# Patient Record
Sex: FEMALE | Employment: OTHER | URBAN - METROPOLITAN AREA
[De-identification: names, ages, dates, MRNs, and addresses within clinical notes are randomized per-mention and may not be internally consistent; named-entity substitution may affect disease eponyms.]

---

## 2017-01-27 ENCOUNTER — TELEPHONE (OUTPATIENT)
Dept: INTERNAL MEDICINE CLINIC | Facility: CLINIC | Age: 62
End: 2017-01-27

## 2017-01-27 DIAGNOSIS — R05.9 COUGH: Primary | ICD-10-CM

## 2017-01-27 NOTE — TELEPHONE ENCOUNTER
Pt asking to speak with Dr. Hesham Fxo regarding a personal issue regarding her mother (mom is not a pt of Dr. Hesham Fox). Please advise.

## 2017-02-16 ENCOUNTER — TELEPHONE (OUTPATIENT)
Dept: INTERNAL MEDICINE CLINIC | Facility: CLINIC | Age: 62
End: 2017-02-16

## 2017-02-16 NOTE — TELEPHONE ENCOUNTER
Pt contacted and informed of NK note below.  appt made as requested  Future Appointments  Date Time Provider Anne Zavala   2/17/2017 11:40 AM Lisandra Medina MD [address]

## 2017-02-16 NOTE — TELEPHONE ENCOUNTER
Per pt, she always have an inverted nipple eversince and last night pt notice that she has bruise on her left breast and would like to know if NK can see her today, tomorrow of Monday,  No appt for today and tomorrow or Monday is only SDS appt is available

## 2017-02-16 NOTE — TELEPHONE ENCOUNTER
Reason for Call/Chief Complaint: left bruise breast  Onset: pt states just noticed it yesterday while undressing.  Unsure of how long she has had bruise  Nursing Assessment/Associated Symptoms: pt c/o yellow green bruise around half of her areola of left br

## 2017-02-17 ENCOUNTER — OFFICE VISIT (OUTPATIENT)
Dept: INTERNAL MEDICINE CLINIC | Facility: CLINIC | Age: 62
End: 2017-02-17

## 2017-02-17 VITALS
HEART RATE: 97 BPM | DIASTOLIC BLOOD PRESSURE: 81 MMHG | WEIGHT: 209 LBS | RESPIRATION RATE: 20 BRPM | BODY MASS INDEX: 37 KG/M2 | SYSTOLIC BLOOD PRESSURE: 136 MMHG

## 2017-02-17 DIAGNOSIS — R58 ECCHYMOSIS: Primary | ICD-10-CM

## 2017-02-17 DIAGNOSIS — Z12.31 ENCOUNTER FOR SCREENING MAMMOGRAM FOR MALIGNANT NEOPLASM OF BREAST: ICD-10-CM

## 2017-02-17 PROCEDURE — 99213 OFFICE O/P EST LOW 20 MIN: CPT | Performed by: INTERNAL MEDICINE

## 2017-02-17 PROCEDURE — 99212 OFFICE O/P EST SF 10 MIN: CPT | Performed by: INTERNAL MEDICINE

## 2017-02-17 NOTE — PROGRESS NOTES
HPI:    Patient ID: Linus Ledbetter is a 62year old female. HPI  Couple days ago discovered sore area on the left breast, discovered bruise, she has no explanation how it happened, no obvious history of trauma.   Hearing is tender, left nipple slightly i breast exhibits no mass, no skin change and no tenderness. Left breast exhibits tenderness. Left breast exhibits no mass and no nipple discharge.  Breasts are symmetrical.       Bruise over left lower inner quadrant of the breast, no masses palpated   Lymph

## 2017-02-18 ENCOUNTER — HOSPITAL ENCOUNTER (OUTPATIENT)
Dept: MAMMOGRAPHY | Age: 62
Discharge: HOME | End: 2017-02-18
Attending: INTERNAL MEDICINE
Payer: COMMERCIAL

## 2017-02-18 DIAGNOSIS — Z12.31 ENCOUNTER FOR SCREENING MAMMOGRAM FOR MALIGNANT NEOPLASM OF BREAST: ICD-10-CM

## 2017-02-18 PROCEDURE — 77067 SCR MAMMO BI INCL CAD: CPT

## 2017-02-22 ENCOUNTER — TELEPHONE (OUTPATIENT)
Dept: INTERNAL MEDICINE CLINIC | Facility: CLINIC | Age: 62
End: 2017-02-22

## 2017-02-22 NOTE — TELEPHONE ENCOUNTER
Pt would like to know her mammogram results? Pt was told that they would be available through [employer] but she cannot access them.

## 2017-02-24 NOTE — TELEPHONE ENCOUNTER
Notes Recorded by Dimitri Delacruz MD on 2/22/2017 at 7:09 PM  Call patient or send letter normal mammogram    LMTCB Please transfer to ext  [phone]

## 2017-02-24 NOTE — TELEPHONE ENCOUNTER
Notes Recorded by Teresa Obrien on 2/23/2017 at 11:29 AM  Letter mailed to pt. If the patient calls back please assist in setting up mychart. Thanks.

## 2017-07-10 ENCOUNTER — OFFICE VISIT (OUTPATIENT)
Dept: INTERNAL MEDICINE CLINIC | Facility: CLINIC | Age: 62
End: 2017-07-10

## 2017-07-10 VITALS
WEIGHT: 205.81 LBS | DIASTOLIC BLOOD PRESSURE: 74 MMHG | RESPIRATION RATE: 18 BRPM | HEIGHT: 63 IN | HEART RATE: 89 BPM | BODY MASS INDEX: 36.46 KG/M2 | TEMPERATURE: 99 F | SYSTOLIC BLOOD PRESSURE: 133 MMHG

## 2017-07-10 DIAGNOSIS — M54.17 RADICULOPATHY OF LUMBOSACRAL REGION: ICD-10-CM

## 2017-07-10 DIAGNOSIS — J98.01 BRONCHOSPASM, ACUTE: Primary | ICD-10-CM

## 2017-07-10 PROCEDURE — 99213 OFFICE O/P EST LOW 20 MIN: CPT | Performed by: INTERNAL MEDICINE

## 2017-07-10 PROCEDURE — 99212 OFFICE O/P EST SF 10 MIN: CPT | Performed by: INTERNAL MEDICINE

## 2017-07-10 RX ORDER — ALBUTEROL SULFATE 90 UG/1
2 AEROSOL, METERED RESPIRATORY (INHALATION) EVERY 4 HOURS PRN
Qty: 1 INHALER | Refills: 6 | Status: SHIPPED | OUTPATIENT
Start: 2017-07-10 | End: 2019-01-24

## 2017-07-10 RX ORDER — AZITHROMYCIN 250 MG/1
TABLET, FILM COATED ORAL
Qty: 6 TABLET | Refills: 0 | Status: SHIPPED | OUTPATIENT
Start: 2017-07-10 | End: 2017-07-18

## 2017-07-10 RX ORDER — CODEINE PHOSPHATE AND GUAIFENESIN 10; 100 MG/5ML; MG/5ML
5 SOLUTION ORAL EVERY 6 HOURS PRN
Qty: 120 ML | Refills: 0 | Status: SHIPPED | OUTPATIENT
Start: 2017-07-10 | End: 2017-07-18

## 2017-07-10 RX ORDER — METHYLPREDNISOLONE 4 MG/1
TABLET ORAL
Qty: 1 KIT | Refills: 0 | Status: SHIPPED | OUTPATIENT
Start: 2017-07-10 | End: 2017-07-18

## 2017-07-10 NOTE — PROGRESS NOTES
HPI:    Patient ID: Reyes Prince is a 65year old female. She has been coughing and SOB for a week. She tried using albuterol from a previous URI. She was fertilizing her lawn on Saturday and it seemed to aggravate it.       Cough   This is a new pro Eyes: Conjunctivae are normal.   Cardiovascular: Normal rate, regular rhythm and normal heart sounds. Pulmonary/Chest: Effort normal. No respiratory distress. She has wheezes. She has no rales.    Diffuse bilateral wheezes   Lymphadenopathy:     She ha

## 2017-07-18 ENCOUNTER — OFFICE VISIT (OUTPATIENT)
Dept: INTERNAL MEDICINE CLINIC | Facility: CLINIC | Age: 62
End: 2017-07-18

## 2017-07-18 ENCOUNTER — HOSPITAL ENCOUNTER (OUTPATIENT)
Dept: GENERAL RADIOLOGY | Age: 62
Discharge: HOME | End: 2017-07-18
Attending: INTERNAL MEDICINE
Payer: COMMERCIAL

## 2017-07-18 VITALS
HEART RATE: 66 BPM | OXYGEN SATURATION: 92 % | RESPIRATION RATE: 18 BRPM | BODY MASS INDEX: 36 KG/M2 | SYSTOLIC BLOOD PRESSURE: 125 MMHG | DIASTOLIC BLOOD PRESSURE: 77 MMHG | TEMPERATURE: 98 F | WEIGHT: 206 LBS

## 2017-07-18 DIAGNOSIS — M54.17 RADICULOPATHY, LUMBOSACRAL REGION: Primary | ICD-10-CM

## 2017-07-18 DIAGNOSIS — R05.9 COUGH: ICD-10-CM

## 2017-07-18 DIAGNOSIS — J98.01 BRONCHOSPASM, ACUTE: ICD-10-CM

## 2017-07-18 PROCEDURE — 99214 OFFICE O/P EST MOD 30 MIN: CPT | Performed by: INTERNAL MEDICINE

## 2017-07-18 PROCEDURE — 94640 AIRWAY INHALATION TREATMENT: CPT | Performed by: INTERNAL MEDICINE

## 2017-07-18 PROCEDURE — 99212 OFFICE O/P EST SF 10 MIN: CPT | Performed by: INTERNAL MEDICINE

## 2017-07-18 PROCEDURE — 71020 XR CHEST PA + LAT CHEST (CPT=71020): CPT | Performed by: INTERNAL MEDICINE

## 2017-07-18 RX ORDER — PREDNISONE 10 MG/1
TABLET ORAL
Qty: 24 TABLET | Refills: 0 | Status: SHIPPED
Start: 2017-07-18 | End: 2017-07-26

## 2017-07-18 RX ORDER — CODEINE PHOSPHATE AND GUAIFENESIN 10; 100 MG/5ML; MG/5ML
5 SOLUTION ORAL 4 TIMES DAILY PRN
Qty: 180 ML | Refills: 0 | Status: SHIPPED | OUTPATIENT
Start: 2017-07-18 | End: 2017-07-28

## 2017-07-18 RX ORDER — PREDNISONE 10 MG/1
TABLET ORAL
Qty: 24 TABLET | Refills: 0 | Status: SHIPPED | OUTPATIENT
Start: 2017-07-18 | End: 2017-07-26

## 2017-07-18 RX ORDER — LEVALBUTEROL INHALATION SOLUTION 0.63 MG/3ML
0.63 SOLUTION RESPIRATORY (INHALATION) ONCE
Status: COMPLETED | OUTPATIENT
Start: 2017-07-18 | End: 2017-07-18

## 2017-07-18 RX ADMIN — LEVALBUTEROL INHALATION SOLUTION 0.63 MG: 0.63 SOLUTION RESPIRATORY (INHALATION) at 13:27:00

## 2017-07-18 NOTE — PROGRESS NOTES
HPI:    Patient ID: Eriberto Wu is a 65year old female presents for follow-up on cough. HPI  Presents for follow-up on cough. . Patient reports that cough developed 3 weeks ago initially she was wheezing.   She was seen by Dr. Nury Ortega 1 week ago compl breakfast. Take vit D replacement as per pcp Disp: 30 tablet Rfl: 12   Cyclobenzaprine HCl 10 MG Oral Tab Take 1 tablet (10 mg total) by mouth nightly.  Disp: 30 tablet Rfl: 0   predniSONE 10 MG Oral Tab Take 20 mg (2 tabs) by mouth 2 times daily for 3 days normal.              ASSESSMENT/PLAN:   Bronchospasm, acute will continue treatment with prednisone in tapering doses, use Proventil inhaler every 4-6 hours for the next 2 3 days, report in 1 week if she is not improved  Radiculopathy, lumbosacral region

## 2017-10-16 ENCOUNTER — TELEPHONE (OUTPATIENT)
Dept: INTERNAL MEDICINE CLINIC | Facility: CLINIC | Age: 62
End: 2017-10-16

## 2017-10-16 RX ORDER — DOXEPIN HYDROCHLORIDE 10 MG/1
10 CAPSULE ORAL NIGHTLY PRN
Qty: 30 CAPSULE | Refills: 0 | Status: SHIPPED | OUTPATIENT
Start: 2017-10-16 | End: 2017-11-12

## 2017-10-16 RX ORDER — LORAZEPAM 0.5 MG/1
TABLET ORAL
Qty: 5 TABLET | Refills: 0 | Status: SHIPPED | OUTPATIENT
Start: 2017-10-16 | End: 2017-10-16 | Stop reason: CLARIF

## 2017-10-16 NOTE — TELEPHONE ENCOUNTER
Dr Kuldeep Goodwin, patient is available on her cell today [phone] and is aware you are in the office until 5 today

## 2017-10-17 RX ORDER — PANTOPRAZOLE SODIUM 40 MG/1
40 TABLET, DELAYED RELEASE ORAL
Qty: 90 TABLET | Refills: 4 | Status: SHIPPED | OUTPATIENT
Start: 2017-10-17 | End: 2018-03-22

## 2017-10-17 NOTE — TELEPHONE ENCOUNTER
Dr. Alie Henriquez, Rx request for Pantoprazole Sodium 40 mg, Please review. Thank you.     LOV: 8/14/15 for EGD  Last Refill: 9/8/16 #30 with 12 Refills

## 2017-10-17 NOTE — TELEPHONE ENCOUNTER
Please have patient make a return appointment within 2-3 months to discuss long-term pantoprazole use. Rx sent for 1 year.

## 2017-10-17 NOTE — TELEPHONE ENCOUNTER
Letter generated and mailed to pt home address. CSS/MARIPOSA- please assist in scheduling OV appt, thanks.

## 2017-10-17 NOTE — TELEPHONE ENCOUNTER
LMTCB, please help pt schedule appt within 2-3 months as requested by Dr. Tom Godinez below. Thank you.

## 2017-10-18 RX ORDER — PANTOPRAZOLE SODIUM 40 MG/1
TABLET, DELAYED RELEASE ORAL
Qty: 30 TABLET | Refills: 0 | OUTPATIENT
Start: 2017-10-18

## 2017-10-25 NOTE — TELEPHONE ENCOUNTER
Spoke to patient on 10/14/2017, reported abnormal test results, she already made an appointment to see orthopedic specialist, she has a disc with your test results, I made one report to her home address

## 2017-10-27 ENCOUNTER — TELEPHONE (OUTPATIENT)
Dept: INTERNAL MEDICINE CLINIC | Facility: CLINIC | Age: 62
End: 2017-10-27

## 2017-11-02 NOTE — TELEPHONE ENCOUNTER
October 25, 2017   Kaleigh Rivera MD          6:45 PM   Note      Spoke to patient on 10/14/2017, reported abnormal test results, she already made an appointment to see orthopedic specialist, she has a disc with your test results, I made one report to her

## 2017-11-12 RX ORDER — DOXEPIN HYDROCHLORIDE 10 MG/1
CAPSULE ORAL
Qty: 30 CAPSULE | Refills: 0 | Status: SHIPPED | OUTPATIENT
Start: 2017-11-12 | End: 2019-01-05 | Stop reason: ALTCHOICE

## 2017-11-17 ENCOUNTER — TELEPHONE (OUTPATIENT)
Dept: INTERNAL MEDICINE CLINIC | Facility: CLINIC | Age: 62
End: 2017-11-17

## 2017-11-17 RX ORDER — TRAMADOL HYDROCHLORIDE 50 MG/1
50 TABLET ORAL EVERY 6 HOURS PRN
Qty: 120 TABLET | Refills: 0 | OUTPATIENT
Start: 2017-11-17 | End: 2018-10-08

## 2017-11-17 NOTE — TELEPHONE ENCOUNTER
Pt states would like to know if Dr. Marissa Douglas can prescribe some pain meds that will help the Pt through out the day for her back until surgery in 1/2018, the reason she needs meds is due to having this surgery Fuse L2-L5 and stenosis.      Pt states has already

## 2017-11-17 NOTE — TELEPHONE ENCOUNTER
Spoke to patient, she will try tramadol 50 mg every 6 hours, warned about possible sedation, advised that that is nonnarcotic pain medication and could be helpful.   She can combine it with doxepin at bedtime, but advised her to start this medication first

## 2018-01-02 ENCOUNTER — HOSPITAL ENCOUNTER (OUTPATIENT)
Age: 63
Discharge: HOME | End: 2018-01-02
Attending: FAMILY MEDICINE
Payer: COMMERCIAL

## 2018-01-02 ENCOUNTER — NURSE TRIAGE (OUTPATIENT)
Dept: OTHER | Age: 63
End: 2018-01-02

## 2018-01-02 VITALS
RESPIRATION RATE: 20 BRPM | OXYGEN SATURATION: 96 % | DIASTOLIC BLOOD PRESSURE: 68 MMHG | HEART RATE: 76 BPM | TEMPERATURE: 98 F | SYSTOLIC BLOOD PRESSURE: 149 MMHG

## 2018-01-02 DIAGNOSIS — J20.9 ACUTE BRONCHITIS, UNSPECIFIED ORGANISM: Primary | ICD-10-CM

## 2018-01-02 PROCEDURE — 99214 OFFICE O/P EST MOD 30 MIN: CPT

## 2018-01-02 PROCEDURE — 99213 OFFICE O/P EST LOW 20 MIN: CPT

## 2018-01-02 RX ORDER — AZITHROMYCIN 250 MG/1
TABLET, FILM COATED ORAL
Qty: 1 PACKAGE | Refills: 0 | Status: SHIPPED | OUTPATIENT
Start: 2018-01-02 | End: 2018-01-07

## 2018-01-02 NOTE — ED PROVIDER NOTES
Patient presents with:  Cough/URI      HPI:     Ameya Spaulding is a 65year old female who presents with for chief complaint of chest congestion, cough. For 4 days. Patient today noticed that the phlegm is slightly green and yellow.   Denies fevers, chill atraumatic  Eyes: conjunctivae/corneas clear. PERRL, EOM's intact. Fundi benign. Ears: normal TM's and external ear canals both ears  Nose: no discharge, no sinus tenderness.  No nasal flaring  Throat: Normal oropharynx  Neck: no adenopathy  RESPIRATORY:

## 2018-01-02 NOTE — TELEPHONE ENCOUNTER
Action Requested: Summary for Provider     []  Critical Lab, Recommendations Needed  [] Need Additional Advice  []   FYI    []   Need Orders  [] Need Medications Sent to Pharmacy  []  Other     SUMMARY: Received call from patient who reports she is having

## 2018-03-22 ENCOUNTER — OFFICE VISIT (OUTPATIENT)
Dept: INTERNAL MEDICINE CLINIC | Facility: CLINIC | Age: 63
End: 2018-03-22

## 2018-03-22 VITALS
WEIGHT: 201 LBS | HEIGHT: 63 IN | DIASTOLIC BLOOD PRESSURE: 80 MMHG | TEMPERATURE: 98 F | SYSTOLIC BLOOD PRESSURE: 139 MMHG | HEART RATE: 82 BPM | BODY MASS INDEX: 35.61 KG/M2

## 2018-03-22 DIAGNOSIS — J02.9 SORE THROAT: Primary | ICD-10-CM

## 2018-03-22 DIAGNOSIS — J02.0 STREP THROAT: ICD-10-CM

## 2018-03-22 LAB
CONTROL LINE PRESENT WITH A CLEAR BACKGROUND (YES/NO): YES YES/NO
KIT LOT #: NORMAL NUMERIC
STREP GRP A CUL-SCR: POSITIVE

## 2018-03-22 PROCEDURE — 99213 OFFICE O/P EST LOW 20 MIN: CPT | Performed by: INTERNAL MEDICINE

## 2018-03-22 PROCEDURE — 99212 OFFICE O/P EST SF 10 MIN: CPT | Performed by: INTERNAL MEDICINE

## 2018-03-22 PROCEDURE — 87880 STREP A ASSAY W/OPTIC: CPT | Performed by: INTERNAL MEDICINE

## 2018-03-22 RX ORDER — PANTOPRAZOLE SODIUM 40 MG/1
40 TABLET, DELAYED RELEASE ORAL
COMMUNITY
Start: 2017-10-17 | End: 2018-03-22

## 2018-03-22 RX ORDER — PANTOPRAZOLE SODIUM 40 MG/1
40 TABLET, DELAYED RELEASE ORAL
Qty: 90 TABLET | Refills: 0 | Status: SHIPPED | OUTPATIENT
Start: 2018-03-22 | End: 2018-09-06

## 2018-03-22 RX ORDER — AMOXICILLIN AND CLAVULANATE POTASSIUM 875; 125 MG/1; MG/1
1 TABLET, FILM COATED ORAL 2 TIMES DAILY
Qty: 20 TABLET | Refills: 0 | Status: SHIPPED | OUTPATIENT
Start: 2018-03-22 | End: 2018-04-01

## 2018-03-22 NOTE — PROGRESS NOTES
Patient ID: Francis Moreno is a 65year old female.   Patient presents with:  Sore Throat: x4 days       HISTORY OF PRESENT ILLNESS:   HPI  3 day(s) ago  Fever - No, Tmax (N/A)  Cough - Yes, productive - Yes, color - clear  Sick contacts - Yes, 2 sons diag Aero Soln, Inhale 2 puffs into the lungs every 4 (four) hours as needed (cough). , Disp: 1 Inhaler, Rfl: 6  •  Cyclobenzaprine HCl 10 MG Oral Tab, Take 1 tablet (10 mg total) by mouth nightly., Disp: 30 tablet, Rfl: 0    Allergies:No Known Allergies      So submandibular, no preauricular, no posterior auricular and no occipital adenopathy present. Neurological: She is alert and oriented to person, place, and time. Psychiatric: She has a normal mood and affect.      Strep test - positive         ASSESSMENT/

## 2018-03-22 NOTE — PATIENT INSTRUCTIONS
Pharyngitis: Strep (Confirmed)    You have had a positive test for strep throat. Strep throat is a contagious illness. It is spread by coughing, kissing or by touching others after touching your mouth or nose.  Symptoms include throat pain that is worse w · Lymph nodes getting larger or becoming soft in the middle  · You can't swallow liquids or you can't open your mouth wide because of throat pain  · Signs of dehydration. These include very dark urine or no urine, sunken eyes, and dizziness.   · Trouble joseline

## 2018-09-06 ENCOUNTER — NURSE TRIAGE (OUTPATIENT)
Dept: OTHER | Age: 63
End: 2018-09-06

## 2018-09-06 ENCOUNTER — TELEPHONE (OUTPATIENT)
Dept: INTERNAL MEDICINE CLINIC | Facility: CLINIC | Age: 63
End: 2018-09-06

## 2018-09-06 RX ORDER — NITROFURANTOIN 25; 75 MG/1; MG/1
100 CAPSULE ORAL 2 TIMES DAILY
Qty: 14 CAPSULE | Refills: 0 | Status: SHIPPED | OUTPATIENT
Start: 2018-09-06 | End: 2018-10-08

## 2018-09-06 RX ORDER — PANTOPRAZOLE SODIUM 40 MG/1
40 TABLET, DELAYED RELEASE ORAL
Qty: 90 TABLET | Refills: 0 | Status: SHIPPED | OUTPATIENT
Start: 2018-09-06 | End: 2018-12-03

## 2018-09-06 NOTE — TELEPHONE ENCOUNTER
Action Requested: Summary for Provider     []  Critical Lab, Recommendations Needed  [x] Need Additional Advice  []   FYI    []   Need Orders  [x] Need Medications Sent to Pharmacy  []  Other     SUMMARY:  Pt requesting RX to Pharmacy- offered Appt -stated

## 2018-09-06 NOTE — TELEPHONE ENCOUNTER
Spoke to patient, we will treat him with Macrobid for 7 days, she knows to report back if she is not improving will be seen for evaluation, she is asking for refill on pantoprazole, she does not take medication daily because she does not have daily symptom

## 2018-09-06 NOTE — TELEPHONE ENCOUNTER
Received call from patient who reports she is returning Dr. Oswaldo Goldstein call. Provider made aware and states she will call patient. Patient made aware.

## 2018-09-13 NOTE — TELEPHONE ENCOUNTER
Pt called back, and informed that she finished antibiotic today and that she is feeling much improved.

## 2018-10-08 ENCOUNTER — LAB ENCOUNTER (OUTPATIENT)
Dept: LAB | Age: 63
End: 2018-10-08
Attending: INTERNAL MEDICINE
Payer: COMMERCIAL

## 2018-10-08 ENCOUNTER — OFFICE VISIT (OUTPATIENT)
Dept: INTERNAL MEDICINE CLINIC | Facility: CLINIC | Age: 63
End: 2018-10-08
Payer: COMMERCIAL

## 2018-10-08 VITALS
HEART RATE: 73 BPM | BODY MASS INDEX: 37.54 KG/M2 | SYSTOLIC BLOOD PRESSURE: 161 MMHG | WEIGHT: 204 LBS | TEMPERATURE: 98 F | RESPIRATION RATE: 18 BRPM | DIASTOLIC BLOOD PRESSURE: 79 MMHG | HEIGHT: 62 IN

## 2018-10-08 DIAGNOSIS — M65.30 TRIGGER FINGER OF RIGHT HAND, UNSPECIFIED FINGER: ICD-10-CM

## 2018-10-08 DIAGNOSIS — Z01.419 ENCOUNTER FOR ANNUAL ROUTINE GYNECOLOGICAL EXAMINATION: ICD-10-CM

## 2018-10-08 DIAGNOSIS — Z01.419 PAP SMEAR, AS PART OF ROUTINE GYNECOLOGICAL EXAMINATION: ICD-10-CM

## 2018-10-08 DIAGNOSIS — Z00.00 PHYSICAL EXAM, ANNUAL: ICD-10-CM

## 2018-10-08 DIAGNOSIS — Z00.00 PHYSICAL EXAM, ANNUAL: Primary | ICD-10-CM

## 2018-10-08 PROCEDURE — 80053 COMPREHEN METABOLIC PANEL: CPT

## 2018-10-08 PROCEDURE — 99396 PREV VISIT EST AGE 40-64: CPT | Performed by: INTERNAL MEDICINE

## 2018-10-08 PROCEDURE — 36415 COLL VENOUS BLD VENIPUNCTURE: CPT

## 2018-10-08 PROCEDURE — 80061 LIPID PANEL: CPT

## 2018-10-08 PROCEDURE — 85025 COMPLETE CBC W/AUTO DIFF WBC: CPT

## 2018-10-08 PROCEDURE — 84443 ASSAY THYROID STIM HORMONE: CPT

## 2018-10-09 NOTE — PROGRESS NOTES
HPI:    Patient ID: Carlos Benjamin is a 59year old female.   Presents for physical exam    HPI  Patient reports that she has been feeling overall much better since she underwent laminectomy 3 levels done in January 2018 at [facility] (cough).  Disp: 1 Inhaler Rfl: 6     BP (!) 161/79 (BP Location: Right arm, Patient Position: Sitting, Cuff Size: large)   Pulse 73   Temp 98.3 °F (36.8 °C) (Oral)   Resp 18   Ht 5' 2\" (1.575 m)   Wt 204 lb (92.5 kg)   BMI 37.31 kg/m²   Physical Exam    Co finger of right hand, unspecified finger see orthopedic specialist  Pap smear, as part of routine follow-up Pap smear, HPV regardless gynecological examination  Encounter for annual routine gynecological examination  Elevated blood pressure, low-salt diet

## 2018-10-15 ENCOUNTER — TELEPHONE (OUTPATIENT)
Dept: INTERNAL MEDICINE CLINIC | Facility: CLINIC | Age: 63
End: 2018-10-15

## 2018-10-22 PROBLEM — M17.12 PRIMARY OSTEOARTHRITIS OF LEFT KNEE: Status: ACTIVE | Noted: 2018-10-22

## 2018-10-22 PROBLEM — M23.92 ACUTE INTERNAL DERANGEMENT OF LEFT KNEE: Status: ACTIVE | Noted: 2018-10-22

## 2018-10-22 PROBLEM — M25.562 ACUTE PAIN OF LEFT KNEE: Status: ACTIVE | Noted: 2018-10-22

## 2018-10-31 PROBLEM — S83.232A COMPLEX TEAR OF MEDIAL MENISCUS OF LEFT KNEE AS CURRENT INJURY: Status: ACTIVE | Noted: 2018-10-31

## 2018-10-31 PROBLEM — M23.92 INTERNAL DERANGEMENT OF KNEE, LEFT: Status: ACTIVE | Noted: 2018-10-22

## 2018-12-03 RX ORDER — PANTOPRAZOLE SODIUM 40 MG/1
TABLET, DELAYED RELEASE ORAL
Qty: 90 TABLET | Refills: 0 | Status: SHIPPED | OUTPATIENT
Start: 2018-12-03 | End: 2019-03-06

## 2018-12-28 ENCOUNTER — LAB ENCOUNTER (OUTPATIENT)
Dept: LAB | Age: 63
End: 2018-12-28
Attending: ORTHOPAEDIC SURGERY
Payer: COMMERCIAL

## 2018-12-28 ENCOUNTER — APPOINTMENT (OUTPATIENT)
Dept: LAB | Age: 63
End: 2018-12-28
Attending: ORTHOPAEDIC SURGERY
Payer: COMMERCIAL

## 2018-12-28 ENCOUNTER — HOSPITAL ENCOUNTER (OUTPATIENT)
Dept: GENERAL RADIOLOGY | Age: 63
Discharge: HOME | End: 2018-12-28
Attending: ORTHOPAEDIC SURGERY
Payer: COMMERCIAL

## 2018-12-28 DIAGNOSIS — M23.92 INTERNAL DERANGEMENT OF KNEE, LEFT: ICD-10-CM

## 2018-12-28 DIAGNOSIS — Z01.818 PREOP TESTING: ICD-10-CM

## 2018-12-28 PROCEDURE — 36415 COLL VENOUS BLD VENIPUNCTURE: CPT

## 2018-12-28 PROCEDURE — 80053 COMPREHEN METABOLIC PANEL: CPT

## 2018-12-28 PROCEDURE — 93005 ELECTROCARDIOGRAM TRACING: CPT

## 2018-12-28 PROCEDURE — 85025 COMPLETE CBC W/AUTO DIFF WBC: CPT

## 2018-12-28 PROCEDURE — 71046 X-RAY EXAM CHEST 2 VIEWS: CPT | Performed by: ORTHOPAEDIC SURGERY

## 2018-12-28 PROCEDURE — 81003 URINALYSIS AUTO W/O SCOPE: CPT

## 2018-12-28 PROCEDURE — 93010 ELECTROCARDIOGRAM REPORT: CPT | Performed by: ORTHOPAEDIC SURGERY

## 2019-01-11 ENCOUNTER — HOSPITAL ENCOUNTER (OUTPATIENT)
Facility: HOSPITAL | Age: 64
Setting detail: HOSPITAL OUTPATIENT SURGERY
Discharge: HOME | End: 2019-01-11
Attending: ORTHOPAEDIC SURGERY | Admitting: ORTHOPAEDIC SURGERY
Payer: COMMERCIAL

## 2019-01-11 ENCOUNTER — ANESTHESIA (OUTPATIENT)
Dept: SURGERY | Facility: HOSPITAL | Age: 64
End: 2019-01-11

## 2019-01-11 ENCOUNTER — ANESTHESIA EVENT (OUTPATIENT)
Dept: SURGERY | Facility: HOSPITAL | Age: 64
End: 2019-01-11

## 2019-01-11 VITALS
DIASTOLIC BLOOD PRESSURE: 53 MMHG | HEART RATE: 67 BPM | BODY MASS INDEX: 35.97 KG/M2 | WEIGHT: 203 LBS | OXYGEN SATURATION: 92 % | HEIGHT: 63 IN | SYSTOLIC BLOOD PRESSURE: 127 MMHG | RESPIRATION RATE: 14 BRPM | TEMPERATURE: 98 F

## 2019-01-11 PROCEDURE — 88304 TISSUE EXAM BY PATHOLOGIST: CPT | Performed by: ORTHOPAEDIC SURGERY

## 2019-01-11 PROCEDURE — 0SBD4ZZ EXCISION OF LEFT KNEE JOINT, PERCUTANEOUS ENDOSCOPIC APPROACH: ICD-10-PCS | Performed by: ORTHOPAEDIC SURGERY

## 2019-01-11 RX ORDER — ACETAMINOPHEN 325 MG/1
650 TABLET ORAL EVERY 4 HOURS PRN
Status: DISCONTINUED | OUTPATIENT
Start: 2019-01-11 | End: 2019-01-11

## 2019-01-11 RX ORDER — CEFAZOLIN SODIUM/WATER 2 G/20 ML
2 SYRINGE (ML) INTRAVENOUS ONCE
Status: COMPLETED | OUTPATIENT
Start: 2019-01-11 | End: 2019-01-11

## 2019-01-11 RX ORDER — SODIUM CHLORIDE, SODIUM LACTATE, POTASSIUM CHLORIDE, CALCIUM CHLORIDE 600; 310; 30; 20 MG/100ML; MG/100ML; MG/100ML; MG/100ML
INJECTION, SOLUTION INTRAVENOUS CONTINUOUS
Status: DISCONTINUED | OUTPATIENT
Start: 2019-01-11 | End: 2019-01-11

## 2019-01-11 RX ORDER — ACETAMINOPHEN 500 MG
1000 TABLET ORAL ONCE
Status: COMPLETED | OUTPATIENT
Start: 2019-01-11 | End: 2019-01-11

## 2019-01-11 RX ORDER — HYDROCODONE BITARTRATE AND ACETAMINOPHEN 5; 325 MG/1; MG/1
2 TABLET ORAL EVERY 4 HOURS PRN
Status: DISCONTINUED | OUTPATIENT
Start: 2019-01-11 | End: 2019-01-11

## 2019-01-11 RX ORDER — MORPHINE SULFATE 4 MG/ML
2 INJECTION, SOLUTION INTRAMUSCULAR; INTRAVENOUS EVERY 10 MIN PRN
Status: DISCONTINUED | OUTPATIENT
Start: 2019-01-11 | End: 2019-01-11

## 2019-01-11 RX ORDER — FAMOTIDINE 20 MG/1
20 TABLET ORAL ONCE
Status: COMPLETED | OUTPATIENT
Start: 2019-01-11 | End: 2019-01-11

## 2019-01-11 RX ORDER — ONDANSETRON 2 MG/ML
INJECTION INTRAMUSCULAR; INTRAVENOUS AS NEEDED
Status: DISCONTINUED | OUTPATIENT
Start: 2019-01-11 | End: 2019-01-11 | Stop reason: SURG

## 2019-01-11 RX ORDER — METOCLOPRAMIDE 10 MG/1
10 TABLET ORAL ONCE
Status: COMPLETED | OUTPATIENT
Start: 2019-01-11 | End: 2019-01-11

## 2019-01-11 RX ORDER — HYDROMORPHONE HYDROCHLORIDE 1 MG/ML
0.5 INJECTION, SOLUTION INTRAMUSCULAR; INTRAVENOUS; SUBCUTANEOUS EVERY 5 MIN PRN
Status: DISCONTINUED | OUTPATIENT
Start: 2019-01-11 | End: 2019-01-11

## 2019-01-11 RX ORDER — ONDANSETRON 2 MG/ML
4 INJECTION INTRAMUSCULAR; INTRAVENOUS ONCE AS NEEDED
Status: DISCONTINUED | OUTPATIENT
Start: 2019-01-11 | End: 2019-01-11

## 2019-01-11 RX ORDER — MORPHINE SULFATE 4 MG/ML
4 INJECTION, SOLUTION INTRAMUSCULAR; INTRAVENOUS EVERY 10 MIN PRN
Status: DISCONTINUED | OUTPATIENT
Start: 2019-01-11 | End: 2019-01-11

## 2019-01-11 RX ORDER — HYDROCODONE BITARTRATE AND ACETAMINOPHEN 5; 325 MG/1; MG/1
1 TABLET ORAL EVERY 4 HOURS PRN
Status: DISCONTINUED | OUTPATIENT
Start: 2019-01-11 | End: 2019-01-11

## 2019-01-11 RX ORDER — DEXAMETHASONE SODIUM PHOSPHATE 4 MG/ML
VIAL (ML) INJECTION AS NEEDED
Status: DISCONTINUED | OUTPATIENT
Start: 2019-01-11 | End: 2019-01-11 | Stop reason: SURG

## 2019-01-11 RX ORDER — ONDANSETRON 2 MG/ML
4 INJECTION INTRAMUSCULAR; INTRAVENOUS EVERY 6 HOURS PRN
Status: DISCONTINUED | OUTPATIENT
Start: 2019-01-11 | End: 2019-01-11

## 2019-01-11 RX ORDER — BUPIVACAINE HYDROCHLORIDE AND EPINEPHRINE 2.5; 5 MG/ML; UG/ML
INJECTION, SOLUTION INFILTRATION; PERINEURAL AS NEEDED
Status: DISCONTINUED | OUTPATIENT
Start: 2019-01-11 | End: 2019-01-11 | Stop reason: HOSPADM

## 2019-01-11 RX ORDER — OXYCODONE AND ACETAMINOPHEN 7.5; 325 MG/1; MG/1
1 TABLET ORAL EVERY 4 HOURS PRN
Qty: 50 TABLET | Refills: 0 | Status: SHIPPED | OUTPATIENT
Start: 2019-01-11 | End: 2019-01-24

## 2019-01-11 RX ORDER — HYDROCODONE BITARTRATE AND ACETAMINOPHEN 5; 325 MG/1; MG/1
2 TABLET ORAL AS NEEDED
Status: DISCONTINUED | OUTPATIENT
Start: 2019-01-11 | End: 2019-01-11

## 2019-01-11 RX ORDER — LIDOCAINE HYDROCHLORIDE 10 MG/ML
INJECTION, SOLUTION EPIDURAL; INFILTRATION; INTRACAUDAL; PERINEURAL AS NEEDED
Status: DISCONTINUED | OUTPATIENT
Start: 2019-01-11 | End: 2019-01-11 | Stop reason: SURG

## 2019-01-11 RX ORDER — HYDROCODONE BITARTRATE AND ACETAMINOPHEN 5; 325 MG/1; MG/1
1 TABLET ORAL AS NEEDED
Status: DISCONTINUED | OUTPATIENT
Start: 2019-01-11 | End: 2019-01-11

## 2019-01-11 RX ORDER — NALOXONE HYDROCHLORIDE 0.4 MG/ML
80 INJECTION, SOLUTION INTRAMUSCULAR; INTRAVENOUS; SUBCUTANEOUS AS NEEDED
Status: DISCONTINUED | OUTPATIENT
Start: 2019-01-11 | End: 2019-01-11

## 2019-01-11 RX ORDER — MIDAZOLAM HYDROCHLORIDE 1 MG/ML
INJECTION INTRAMUSCULAR; INTRAVENOUS AS NEEDED
Status: DISCONTINUED | OUTPATIENT
Start: 2019-01-11 | End: 2019-01-11 | Stop reason: SURG

## 2019-01-11 RX ORDER — MORPHINE SULFATE 10 MG/ML
6 INJECTION, SOLUTION INTRAMUSCULAR; INTRAVENOUS EVERY 10 MIN PRN
Status: DISCONTINUED | OUTPATIENT
Start: 2019-01-11 | End: 2019-01-11

## 2019-01-11 RX ADMIN — SODIUM CHLORIDE, SODIUM LACTATE, POTASSIUM CHLORIDE, CALCIUM CHLORIDE: 600; 310; 30; 20 INJECTION, SOLUTION INTRAVENOUS at 07:56:00

## 2019-01-11 RX ADMIN — CEFAZOLIN SODIUM/WATER 2 G: 2 G/20 ML SYRINGE (ML) INTRAVENOUS at 08:02:00

## 2019-01-11 RX ADMIN — DEXAMETHASONE SODIUM PHOSPHATE 4 MG: 4 MG/ML VIAL (ML) INJECTION at 08:10:00

## 2019-01-11 RX ADMIN — SODIUM CHLORIDE, SODIUM LACTATE, POTASSIUM CHLORIDE, CALCIUM CHLORIDE: 600; 310; 30; 20 INJECTION, SOLUTION INTRAVENOUS at 09:39:00

## 2019-01-11 RX ADMIN — ONDANSETRON 4 MG: 2 INJECTION INTRAMUSCULAR; INTRAVENOUS at 08:10:00

## 2019-01-11 RX ADMIN — MIDAZOLAM HYDROCHLORIDE 2 MG: 1 INJECTION INTRAMUSCULAR; INTRAVENOUS at 07:56:00

## 2019-01-11 RX ADMIN — LIDOCAINE HYDROCHLORIDE 30 MG: 10 INJECTION, SOLUTION EPIDURAL; INFILTRATION; INTRACAUDAL; PERINEURAL at 07:59:00

## 2019-01-11 NOTE — H&P
Francis Moreno: Everett Camejo presents as a 61-year-old female is been experiencing a greater than 6 -month history of pain in the left knee associated with swelling and giving way symptoms.   Her pain is greater posterior and medial.  She denies any identifiable t • Arthritis     • Fibroids       D&C 2000   • Other and unspecified disc disorder of unspecified region       \"disc bulge L5, S1\" per NG   • Sciatica 2010       Surgical History:          Past Surgical History:   Procedure Laterality Date   • ADENOIDEC is negative.     · Quadriceps activation test is negative.     · The patient can perform an active straight leg raise.      · There are no observable or palpable defects.       · No soft tissue masses present.      · No prepatellar swelling is seen.      · great detail with the patient using an anatomic illustration/3D model.  We discussed the benefits, risks, and all the potential complications of the various treatment options including continuation of limitation of activity, modification of activity, repeat informed consent had been obtained.  The patient was told if there was a good understanding of the above discussion and the patient feels the potential benefits of the surgery outweigh the potential risks, then we would consider proceeding accordingly as lo

## 2019-01-11 NOTE — OPERATIVE REPORT
[facility]    PATIENT'S NAME: Fernie Rowdy   ATTENDING PHYSICIAN: Mckenzie Guerrero MD   OPERATING PHYSICIAN: Mckenzie Guerrero MD   PATIENT ACCOUNT#:   [de-identified]    LOCATION:  [facility] [address] PACU [facility] 10  MEDICAL RECORD #:   [id number] risks of various treatment options, including limitation of activity, modification of activity, repeat intraarticular corticosteroid injections, hyaluronic acid gel injection, arthroscopy with arthroscopic debridement, and even total knee arthroplasty.   Al for introduction of the inflow cannula by using an 11 blade knife to enter the skin followed by a sharp trocar to enter the capsule, then a blunt trocar to enter the joint itself.   In similar fashion, inferomedial and inferolateral parapatellar portals wer a small synovial shaver, which was also utilized in the suprapatellar pouch.   All compartments were reexamined from both the inferomedial and inferolateral parapatellar portals confirming the above diagnosis and that an adequate debridement was indeed perf

## 2019-01-11 NOTE — ANESTHESIA POSTPROCEDURE EVALUATION
Patient: Eriberto Wu    Procedure Summary     Date:  01/11/19 Room / Location:  [address] [place] [address] MAIN OR 12 / [address] [place] [address] MAIN OR    Anesthesia Start:  2599 Anesthesia Stop:  2036    Procedure:  KNEE ARTHROSCOPY (Left Knee) Diagnosis:  (internal derangement left knee)    [place]

## 2019-01-11 NOTE — INTERVAL H&P NOTE
Pre-op Diagnosis: internal derangement left knee    The above referenced H&P was reviewed by Prakash Sullivan MD on 1/11/2019, the patient was examined and no significant changes have occurred in the patient's condition since the H&P was performed.   I dis

## 2019-01-11 NOTE — ANESTHESIA PROCEDURE NOTES
ANESTHESIA INTUBATION  Urgency: elective      General Information and Staff    Patient location during procedure: OR  Anesthesiologist: Kylee Bloom MD  Resident/CRNA: Lydia Paniagua CRNA  Performed: CRNA     Indications and Patient Condition  Indicati

## 2019-01-11 NOTE — BRIEF OP NOTE
[facility]   Brief Op Note    Patients Name: Francis Moreno  Attending Physician: Bennett Ríos MD  Operating Physician: Radu Owusu MD  CSN: [id number]     Location:  OR  MRN: [id number]    YOB: 1955  Admissi

## 2019-01-11 NOTE — CONSULTS
Carina Fuchs: Brandin Daniels presents as a 20-year-old female is been experiencing a greater than 6 -month history of pain in the left knee associated with swelling and giving way symptoms.   Her pain is greater posterior and medial.  She denies any identifiable t • Arthritis     • Fibroids       D&C 2000   • Other and unspecified disc disorder of unspecified region       \"disc bulge L5, S1\" per NG   • Sciatica 2010       Surgical History:          Past Surgical History:   Procedure Laterality Date   • ADENOIDEC is negative.     · Quadriceps activation test is negative.     · The patient can perform an active straight leg raise.      · There are no observable or palpable defects.       · No soft tissue masses present.      · No prepatellar swelling is seen.      · great detail with the patient using an anatomic illustration/3D model.  We discussed the benefits, risks, and all the potential complications of the various treatment options including continuation of limitation of activity, modification of activity, repeat informed consent had been obtained.  The patient was told if there was a good understanding of the above discussion and the patient feels the potential benefits of the surgery outweigh the potential risks, then we would consider proceeding accordingly as lo

## 2019-01-11 NOTE — ANESTHESIA PREPROCEDURE EVALUATION
Anesthesia PreOp Note    HPI:     Dennis Murillo is a 59year old female who presents for preoperative consultation requested by: Fannie Grier MD    Date of Surgery: 1/11/2019    Procedure(s):  KNEE ARTHROSCOPY  Indication: internal derangement left • OTHER SURGICAL HISTORY  1989    \"cyst removed from wrist\" per NG   • SPINE SURGERY PROCEDURE UNLISTED      Fusion L4-5   • T&A  1963   • TONSILLECTOMY           Medications Prior to Admission:  HYDROcodone-acetaminophen (NORCO) 5-325 MG Oral Tab Take 1 Smokeless tobacco: Never Used    Substance and Sexual Activity      Alcohol use: Yes        Comment: occassional      Drug use: No      Sexual activity: Not on file    Other Topics      Concerns:         Service: Not Asked        Blood Transfus (+) shortness of breath (resolved),   (-) asthma  Cardiovascular - negative ROS  Exercise tolerance: good    ECG reviewed  Rhythm: regular  Rate: normal    Neuro/Psych - negative ROS   (+) neuromuscular disease,     GI/Hepatic/Renal - negative ROS   (+) PU

## 2019-01-17 PROBLEM — S83.272A COMPLEX TEAR OF LATERAL MENISCUS OF LEFT KNEE AS CURRENT INJURY: Status: ACTIVE | Noted: 2019-01-17

## 2019-01-24 ENCOUNTER — TELEPHONE (OUTPATIENT)
Dept: INTERNAL MEDICINE CLINIC | Facility: CLINIC | Age: 64
End: 2019-01-24

## 2019-01-24 NOTE — TELEPHONE ENCOUNTER
Pt states Dr Selvin Quintana (orthopedic surgeon) today prescribed meloxicam 15 mg (visibel in chart) but wanted pt to check with NK if she is ok with pt being on meloxicam. Pt has not picked up the medication and will await response.

## 2019-02-11 ENCOUNTER — HOSPITAL ENCOUNTER (OUTPATIENT)
Dept: ULTRASOUND IMAGING | Facility: HOSPITAL | Age: 64
Discharge: HOME | End: 2019-02-11
Attending: ORTHOPAEDIC SURGERY
Payer: COMMERCIAL

## 2019-02-11 DIAGNOSIS — M79.662 PAIN OF LEFT CALF: ICD-10-CM

## 2019-02-11 DIAGNOSIS — R60.9 SWELLING: ICD-10-CM

## 2019-02-11 PROCEDURE — 93971 EXTREMITY STUDY: CPT | Performed by: ORTHOPAEDIC SURGERY

## 2019-03-06 RX ORDER — PANTOPRAZOLE SODIUM 40 MG/1
TABLET, DELAYED RELEASE ORAL
Qty: 90 TABLET | Refills: 0 | Status: SHIPPED | OUTPATIENT
Start: 2019-03-06 | End: 2019-03-22

## 2019-03-12 ENCOUNTER — OFFICE VISIT (OUTPATIENT)
Dept: FAMILY MEDICINE CLINIC | Facility: CLINIC | Age: 64
End: 2019-03-12
Payer: COMMERCIAL

## 2019-03-12 ENCOUNTER — NURSE TRIAGE (OUTPATIENT)
Dept: OTHER | Age: 64
End: 2019-03-12

## 2019-03-12 VITALS
RESPIRATION RATE: 16 BRPM | HEIGHT: 62 IN | TEMPERATURE: 98 F | SYSTOLIC BLOOD PRESSURE: 152 MMHG | BODY MASS INDEX: 38.64 KG/M2 | DIASTOLIC BLOOD PRESSURE: 86 MMHG | WEIGHT: 210 LBS | HEART RATE: 81 BPM

## 2019-03-12 DIAGNOSIS — E78.2 MIXED HYPERLIPIDEMIA: ICD-10-CM

## 2019-03-12 DIAGNOSIS — H61.23 IMPACTED CERUMEN OF BOTH EARS: Primary | ICD-10-CM

## 2019-03-12 DIAGNOSIS — H60.502 ACUTE OTITIS EXTERNA OF LEFT EAR, UNSPECIFIED TYPE: ICD-10-CM

## 2019-03-12 DIAGNOSIS — I10 ESSENTIAL HYPERTENSION: ICD-10-CM

## 2019-03-12 PROBLEM — R58 ECCHYMOSIS: Status: RESOLVED | Noted: 2017-02-17 | Resolved: 2019-03-12

## 2019-03-12 PROBLEM — Z12.31 ENCOUNTER FOR SCREENING MAMMOGRAM FOR MALIGNANT NEOPLASM OF BREAST: Status: RESOLVED | Noted: 2017-02-17 | Resolved: 2019-03-12

## 2019-03-12 PROCEDURE — 99213 OFFICE O/P EST LOW 20 MIN: CPT | Performed by: PHYSICIAN ASSISTANT

## 2019-03-12 PROCEDURE — 99212 OFFICE O/P EST SF 10 MIN: CPT | Performed by: PHYSICIAN ASSISTANT

## 2019-03-12 NOTE — PROGRESS NOTES
HPI:     HPI  59year-old female is here in the office complaining of dizziness. Patient states that she had 1 episode of dizziness last week while she was on vacation and feels dizziness daily for 4 days. It lasts few seconds and goes away by ifself.   Reji Patel Past Surgical History:   Procedure Laterality Date   • Adenoidectomy     • Back surgery  01/08/2018    Laminectomy   • D & c  2000   • Knee arthroscopy Left 01/11/2018    left knee arthroscopy   • Other surgical history  1989    \"cyst removed from i sexual activity: Not on file    Other Topics      Concerns:         Service: Not Asked        Blood Transfusions: Not Asked        Caffeine Concern: No        Occupational Exposure: Not Asked        Hobby Hazards: Not Asked        Sleep Concern: No rales. She exhibits no tenderness. Lymphadenopathy:     She has no cervical adenopathy. Neurological: She is alert and oriented to person, place, and time. She has normal reflexes. Skin: Skin is intact. No rash noted.    Psychiatric: She has a normal

## 2019-03-12 NOTE — ASSESSMENT & PLAN NOTE
After ear irrigation, left ear canal: +erythematous, Left TM: intact and no erythema. Start Cortisporin otic drop.

## 2019-03-12 NOTE — ASSESSMENT & PLAN NOTE
Patient refuses anti-hypertension medication. Discussed with patient the risks and benefits with and without medication. Patient verbalized understanding but refuses to take medication.

## 2019-03-12 NOTE — TELEPHONE ENCOUNTER
Action Requested: Summary for Provider     []  Critical Lab, Recommendations Needed  [] Need Additional Advice  [x]   FYI    []   Need Orders  [] Need Medications Sent to Pharmacy  []  Other     SUMMARY: Patient calling with complaint of dizziness that sta

## 2019-03-12 NOTE — ASSESSMENT & PLAN NOTE
Ear irrigation Bl, but unable to removal wax from the right ear. Advise patient to apply Debrox solution to the right ear 8-10 drops at night.

## 2019-03-12 NOTE — PROCEDURES
Bilat ears irrigated with warm H20 and H2O2. small amt of wax removed from left ear. Left  ear canals: erythematous and left TM intact without erythema. I am unable to remove wax from the right ear. Pt denies dizziness or vertigo.  Pt tolerate procedure wel

## 2019-03-18 ENCOUNTER — TELEPHONE (OUTPATIENT)
Dept: INTERNAL MEDICINE CLINIC | Facility: CLINIC | Age: 64
End: 2019-03-18

## 2019-03-18 NOTE — TELEPHONE ENCOUNTER
Verified pt name and . Reviewed doctor's recommendations as noted below. Pt agreed with plan of care, states in the past she has seen Dr. Deedee Aguirre, transferred to ENT to schedule appt.

## 2019-03-18 NOTE — TELEPHONE ENCOUNTER
Pt needs to see ENT I will not  BE  ABLE TO HELP HER  if she is still dizzy,she should see ENT   DR CASTRO/ Anika Velasquez  [phone] FOR  CERUMEN  REMOVAL

## 2019-03-18 NOTE — TELEPHONE ENCOUNTER
Dr Renetta Sanchez, please advise. Scheduled for Tuesday 3/19/19 at 2:40 pm [place]--could you see today? Patient seen OV 3/12/19 for dizziness and impacted cerumen of ears.  She's been using the debrox drops at night with no relief or drainage from the ears and

## 2019-03-19 ENCOUNTER — OFFICE VISIT (OUTPATIENT)
Dept: OTOLARYNGOLOGY | Facility: CLINIC | Age: 64
End: 2019-03-19
Payer: COMMERCIAL

## 2019-03-19 VITALS
BODY MASS INDEX: 38.64 KG/M2 | WEIGHT: 210 LBS | SYSTOLIC BLOOD PRESSURE: 159 MMHG | HEIGHT: 62 IN | DIASTOLIC BLOOD PRESSURE: 89 MMHG | TEMPERATURE: 97 F

## 2019-03-19 DIAGNOSIS — H81.12 BENIGN PAROXYSMAL POSITIONAL VERTIGO OF LEFT EAR: ICD-10-CM

## 2019-03-19 DIAGNOSIS — H61.21 IMPACTED CERUMEN OF RIGHT EAR: Primary | ICD-10-CM

## 2019-03-19 PROCEDURE — 92504 EAR MICROSCOPY EXAMINATION: CPT | Performed by: OTOLARYNGOLOGY

## 2019-03-19 PROCEDURE — 99212 OFFICE O/P EST SF 10 MIN: CPT | Performed by: OTOLARYNGOLOGY

## 2019-03-19 PROCEDURE — 99213 OFFICE O/P EST LOW 20 MIN: CPT | Performed by: OTOLARYNGOLOGY

## 2019-03-19 NOTE — PROGRESS NOTES
Art De Anda is a 59year old female. Patient presents with:  Cerumen Impaction: pt reports right ear feels muffled and dizzy    HPI:   She had a large amount of wax in her right ear especially which was not able to be completely removed.   She is been fe Used    Alcohol use: Yes      Comment: occassional    Drug use: No       REVIEW OF SYSTEMS:   GENERAL HEALTH: feels well otherwise  GENERAL : denies fever, chills, sweats, weight loss, weight gain  SKIN: denies any unusual skin lesions or rashes  RESPIRATO some exercises that she can try if her problems persist.  May need to consider physical therapy for problems continue      The patient indicates understanding of these issues and agrees to the plan. Stan Owen MD  3/19/2019  11:40 AM

## 2019-03-22 ENCOUNTER — OFFICE VISIT (OUTPATIENT)
Dept: FAMILY MEDICINE CLINIC | Facility: CLINIC | Age: 64
End: 2019-03-22
Payer: COMMERCIAL

## 2019-03-22 VITALS
TEMPERATURE: 98 F | DIASTOLIC BLOOD PRESSURE: 86 MMHG | WEIGHT: 214.13 LBS | BODY MASS INDEX: 39 KG/M2 | SYSTOLIC BLOOD PRESSURE: 144 MMHG | HEART RATE: 71 BPM

## 2019-03-22 DIAGNOSIS — B02.9 HERPES ZOSTER WITHOUT COMPLICATION: Primary | ICD-10-CM

## 2019-03-22 PROBLEM — H60.502 ACUTE OTITIS EXTERNA OF LEFT EAR: Status: RESOLVED | Noted: 2019-03-12 | Resolved: 2019-03-22

## 2019-03-22 PROCEDURE — 99212 OFFICE O/P EST SF 10 MIN: CPT | Performed by: PHYSICIAN ASSISTANT

## 2019-03-22 PROCEDURE — 99213 OFFICE O/P EST LOW 20 MIN: CPT | Performed by: PHYSICIAN ASSISTANT

## 2019-03-22 RX ORDER — GABAPENTIN 300 MG/1
300 CAPSULE ORAL 3 TIMES DAILY
Qty: 60 CAPSULE | Refills: 0 | Status: SHIPPED | OUTPATIENT
Start: 2019-03-22 | End: 2020-09-11

## 2019-03-22 RX ORDER — LORATADINE 10 MG/1
10 TABLET ORAL DAILY
COMMUNITY
End: 2019-08-20

## 2019-03-22 NOTE — PROGRESS NOTES
HPI:     HPI  59year-old female is here in the office complaining of rash on left upper shoulder for 1.5 week while she was on vacation. Patient states that she feels pain, burning sensation and itching. She has been taking Tramadol for pain.  Patient Deedee Ferguson • Knee arthroscopy Left 01/11/2018    left knee arthroscopy   • Other surgical history  1989    \"cyst removed from wrist\" per NG   • Spine surgery procedure unlisted      Fusion L4-5   • T&a  1963   • Tonsillectomy         Family History:     Family Hist Caffeine Concern: No        Occupational Exposure: Not Asked        Hobby Hazards: Not Asked        Sleep Concern: Not Asked        Stress Concern: Not Asked        Weight Concern: Not Asked        Special Diet: Not Asked        Back Care: Not Asked Neurological: She is alert and oriented to person, place, and time. She has normal reflexes. Skin:        Psychiatric: She has a normal mood and affect. Left upper shoulder: healing/scaly rash, no blisters found.       Assessment and Plan[de-identified]     Problem

## 2019-03-23 NOTE — ASSESSMENT & PLAN NOTE
Start Gabapentin 300 mg PO TID as needed for pain. Advise patient to apply Hydrocortisone cream for itching.

## 2019-06-06 RX ORDER — PANTOPRAZOLE SODIUM 40 MG/1
TABLET, DELAYED RELEASE ORAL
Qty: 90 TABLET | Refills: 0 | Status: SHIPPED | OUTPATIENT
Start: 2019-06-06 | End: 2020-01-11

## 2019-06-27 ENCOUNTER — HOSPITAL ENCOUNTER (OUTPATIENT)
Age: 64
Discharge: HOME | End: 2019-06-27
Attending: FAMILY MEDICINE
Payer: COMMERCIAL

## 2019-06-27 VITALS
WEIGHT: 207 LBS | SYSTOLIC BLOOD PRESSURE: 160 MMHG | OXYGEN SATURATION: 98 % | BODY MASS INDEX: 38.09 KG/M2 | DIASTOLIC BLOOD PRESSURE: 75 MMHG | TEMPERATURE: 97 F | RESPIRATION RATE: 18 BRPM | HEIGHT: 62 IN | HEART RATE: 71 BPM

## 2019-06-27 DIAGNOSIS — R21 RASH IN ADULT: ICD-10-CM

## 2019-06-27 DIAGNOSIS — J30.9 ALLERGIC RHINITIS, UNSPECIFIED SEASONALITY, UNSPECIFIED TRIGGER: Primary | ICD-10-CM

## 2019-06-27 DIAGNOSIS — J02.9 PHARYNGITIS, UNSPECIFIED ETIOLOGY: ICD-10-CM

## 2019-06-27 PROCEDURE — 99212 OFFICE O/P EST SF 10 MIN: CPT

## 2019-06-27 PROCEDURE — 87430 STREP A AG IA: CPT

## 2019-06-27 NOTE — ED PROVIDER NOTES
Patient Seen in: [facility]    History   Patient presents with:  Sore Throat    Stated Complaint: TL-Sore Throat    HPI    Patient here with sore throat for 1 days. Has a h/o allergies and has post nasal drip. No fevers. mouth as needed. gabapentin 300 MG Oral Cap,  Take 1 capsule (300 mg total) by mouth 3 (three) times daily.        Family History   Problem Relation Age of Onset   • Diabetes Father 76   • Heart Disease Brother 62   • Diabetes Brother        Family hist Plan     Clinical Impression:  Allergic rhinitis, unspecified seasonality, unspecified trigger  (primary encounter diagnosis)  Pharyngitis, unspecified etiology  Rash in adult    Disposition:  Discharge    Follow-up:  Ran Dasilva MD  [facility]

## 2019-06-27 NOTE — ED INITIAL ASSESSMENT (HPI)
PATIENT ARRIVED AMBULATORY TO ROOM C/O A SORE THROAT. PATIENT ALSO C/O A RASH UNDER THE LEFT BREAST.  PATIENT STATES \"I HAVE BEEN OUT OF TOWN AND WAS GARDENING BEFORE I LEFT SO I WANTED TO GET IT CHECKED OUT\"

## 2019-08-05 PROBLEM — M23.301 DEGENERATIVE TEAR OF LATERAL MENISCUS OF LEFT KNEE: Status: ACTIVE | Noted: 2019-01-17

## 2019-08-05 PROBLEM — M79.651 RIGHT THIGH PAIN: Status: ACTIVE | Noted: 2019-08-05

## 2019-08-05 PROBLEM — R10.31 RIGHT GROIN PAIN: Status: ACTIVE | Noted: 2019-08-05

## 2019-08-20 ENCOUNTER — OFFICE VISIT (OUTPATIENT)
Dept: INTERNAL MEDICINE CLINIC | Facility: CLINIC | Age: 64
End: 2019-08-20
Payer: COMMERCIAL

## 2019-08-20 VITALS
SYSTOLIC BLOOD PRESSURE: 164 MMHG | BODY MASS INDEX: 37 KG/M2 | WEIGHT: 203 LBS | RESPIRATION RATE: 20 BRPM | HEART RATE: 78 BPM | DIASTOLIC BLOOD PRESSURE: 79 MMHG

## 2019-08-20 DIAGNOSIS — E78.00 PURE HYPERCHOLESTEROLEMIA: ICD-10-CM

## 2019-08-20 DIAGNOSIS — I10 ESSENTIAL HYPERTENSION: Primary | ICD-10-CM

## 2019-08-20 DIAGNOSIS — Z12.31 BREAST CANCER SCREENING BY MAMMOGRAM: ICD-10-CM

## 2019-08-20 DIAGNOSIS — N85.2 ENLARGED UTERUS: ICD-10-CM

## 2019-08-20 PROBLEM — H61.23 IMPACTED CERUMEN OF BOTH EARS: Status: RESOLVED | Noted: 2019-03-12 | Resolved: 2019-08-20

## 2019-08-20 PROBLEM — B02.9 HERPES ZOSTER WITHOUT COMPLICATION: Status: RESOLVED | Noted: 2019-03-22 | Resolved: 2019-08-20

## 2019-08-20 PROCEDURE — 99214 OFFICE O/P EST MOD 30 MIN: CPT | Performed by: INTERNAL MEDICINE

## 2019-08-20 RX ORDER — LOSARTAN POTASSIUM 25 MG/1
25 TABLET ORAL DAILY
Qty: 90 TABLET | Refills: 0 | Status: SHIPPED | OUTPATIENT
Start: 2019-08-20 | End: 2020-01-11

## 2019-08-21 NOTE — PROGRESS NOTES
HPI:    Patient ID: Merlinda Mattock is a 62year old female.   Presents for evaluation of several concerns    HPI  Patient reports that she has been dealing with left knee pain which is slowly improving after surgery, she has been taking Celebrex on a daily Location: Left arm, Patient Position: Sitting, Cuff Size: adult)   Pulse 78   Resp 20   Wt 203 lb (92.1 kg)   BMI 37.13 kg/m²    Physical Exam    Constitutional: She is oriented to person, place, and time and obese.  She appears well-developed and well-nour [id number]

## 2019-09-04 ENCOUNTER — HOSPITAL ENCOUNTER (OUTPATIENT)
Dept: MAMMOGRAPHY | Age: 64
Discharge: HOME | End: 2019-09-04
Attending: INTERNAL MEDICINE
Payer: COMMERCIAL

## 2019-09-04 ENCOUNTER — APPOINTMENT (OUTPATIENT)
Dept: LAB | Age: 64
End: 2019-09-04
Attending: INTERNAL MEDICINE
Payer: COMMERCIAL

## 2019-09-04 DIAGNOSIS — Z12.31 BREAST CANCER SCREENING BY MAMMOGRAM: ICD-10-CM

## 2019-09-04 PROCEDURE — 77067 SCR MAMMO BI INCL CAD: CPT | Performed by: INTERNAL MEDICINE

## 2019-09-04 PROCEDURE — 77063 BREAST TOMOSYNTHESIS BI: CPT | Performed by: INTERNAL MEDICINE

## 2019-09-05 ENCOUNTER — LAB ENCOUNTER (OUTPATIENT)
Dept: LAB | Age: 64
End: 2019-09-05
Attending: INTERNAL MEDICINE
Payer: COMMERCIAL

## 2019-09-05 ENCOUNTER — HOSPITAL ENCOUNTER (OUTPATIENT)
Dept: ULTRASOUND IMAGING | Age: 64
Discharge: HOME | End: 2019-09-05
Attending: INTERNAL MEDICINE
Payer: COMMERCIAL

## 2019-09-05 DIAGNOSIS — E78.00 PURE HYPERCHOLESTEROLEMIA: ICD-10-CM

## 2019-09-05 DIAGNOSIS — N85.2 ENLARGED UTERUS: ICD-10-CM

## 2019-09-05 DIAGNOSIS — N83.201 CYST OF RIGHT OVARY: Primary | ICD-10-CM

## 2019-09-05 DIAGNOSIS — I10 ESSENTIAL HYPERTENSION: ICD-10-CM

## 2019-09-05 LAB
ALBUMIN SERPL-MCNC: 3.9 G/DL (ref 3.4–5)
ALBUMIN/GLOB SERPL: 1.1 {RATIO} (ref 1–2)
ALP LIVER SERPL-CCNC: 68 U/L (ref 50–130)
ALT SERPL-CCNC: 26 U/L (ref 13–56)
ANION GAP SERPL CALC-SCNC: 6 MMOL/L (ref 0–18)
AST SERPL-CCNC: 20 U/L (ref 15–37)
BASOPHILS # BLD AUTO: 0.03 X10(3) UL (ref 0–0.2)
BASOPHILS NFR BLD AUTO: 0.4 %
BILIRUB SERPL-MCNC: 0.6 MG/DL (ref 0.1–2)
BUN BLD-MCNC: 16 MG/DL (ref 7–18)
BUN/CREAT SERPL: 21.1 (ref 10–20)
CALCIUM BLD-MCNC: 9.2 MG/DL (ref 8.5–10.1)
CHLORIDE SERPL-SCNC: 101 MMOL/L (ref 98–112)
CHOLEST SMN-MCNC: 214 MG/DL (ref ?–200)
CO2 SERPL-SCNC: 31 MMOL/L (ref 21–32)
CREAT BLD-MCNC: 0.76 MG/DL (ref 0.55–1.02)
DEPRECATED RDW RBC AUTO: 42.6 FL (ref 35.1–46.3)
EOSINOPHIL # BLD AUTO: 0.18 X10(3) UL (ref 0–0.7)
EOSINOPHIL NFR BLD AUTO: 2.6 %
ERYTHROCYTE [DISTWIDTH] IN BLOOD BY AUTOMATED COUNT: 13.2 % (ref 11–15)
GLOBULIN PLAS-MCNC: 3.5 G/DL (ref 2.8–4.4)
GLUCOSE BLD-MCNC: 107 MG/DL (ref 70–99)
HCT VFR BLD AUTO: 44.2 % (ref 35–48)
HDLC SERPL-MCNC: 56 MG/DL (ref 40–59)
HGB BLD-MCNC: 14.1 G/DL (ref 12–16)
IMM GRANULOCYTES # BLD AUTO: 0.02 X10(3) UL (ref 0–1)
IMM GRANULOCYTES NFR BLD: 0.3 %
LDLC SERPL CALC-MCNC: 146 MG/DL (ref ?–100)
LYMPHOCYTES # BLD AUTO: 2.41 X10(3) UL (ref 1–4)
LYMPHOCYTES NFR BLD AUTO: 35.2 %
M PROTEIN MFR SERPL ELPH: 7.4 G/DL (ref 6.4–8.2)
MCH RBC QN AUTO: 28 PG (ref 26–34)
MCHC RBC AUTO-ENTMCNC: 31.9 G/DL (ref 31–37)
MCV RBC AUTO: 87.7 FL (ref 80–100)
MONOCYTES # BLD AUTO: 0.58 X10(3) UL (ref 0.1–1)
MONOCYTES NFR BLD AUTO: 8.5 %
NEUTROPHILS # BLD AUTO: 3.63 X10 (3) UL (ref 1.5–7.7)
NEUTROPHILS # BLD AUTO: 3.63 X10(3) UL (ref 1.5–7.7)
NEUTROPHILS NFR BLD AUTO: 53 %
NONHDLC SERPL-MCNC: 158 MG/DL (ref ?–130)
OSMOLALITY SERPL CALC.SUM OF ELEC: 288 MOSM/KG (ref 275–295)
PATIENT FASTING: YES
PATIENT FASTING: YES
PLATELET # BLD AUTO: 252 10(3)UL (ref 150–450)
POTASSIUM SERPL-SCNC: 4.1 MMOL/L (ref 3.5–5.1)
RBC # BLD AUTO: 5.04 X10(6)UL (ref 3.8–5.3)
SODIUM SERPL-SCNC: 138 MMOL/L (ref 136–145)
TRIGL SERPL-MCNC: 61 MG/DL (ref 30–149)
VLDLC SERPL CALC-MCNC: 12 MG/DL (ref 0–30)
WBC # BLD AUTO: 6.9 X10(3) UL (ref 4–11)

## 2019-09-05 PROCEDURE — 80053 COMPREHEN METABOLIC PANEL: CPT

## 2019-09-05 PROCEDURE — 76830 TRANSVAGINAL US NON-OB: CPT | Performed by: INTERNAL MEDICINE

## 2019-09-05 PROCEDURE — 85025 COMPLETE CBC W/AUTO DIFF WBC: CPT

## 2019-09-05 PROCEDURE — 76856 US EXAM PELVIC COMPLETE: CPT | Performed by: INTERNAL MEDICINE

## 2019-09-05 PROCEDURE — 80061 LIPID PANEL: CPT

## 2019-09-05 PROCEDURE — 36415 COLL VENOUS BLD VENIPUNCTURE: CPT

## 2019-11-15 ENCOUNTER — HOSPITAL ENCOUNTER (OUTPATIENT)
Age: 64
Discharge: HOME | End: 2019-11-15
Attending: EMERGENCY MEDICINE
Payer: COMMERCIAL

## 2019-11-15 VITALS
SYSTOLIC BLOOD PRESSURE: 156 MMHG | RESPIRATION RATE: 18 BRPM | DIASTOLIC BLOOD PRESSURE: 69 MMHG | WEIGHT: 180 LBS | OXYGEN SATURATION: 95 % | HEART RATE: 68 BPM | TEMPERATURE: 99 F | BODY MASS INDEX: 33 KG/M2

## 2019-11-15 DIAGNOSIS — J06.9 UPPER RESPIRATORY TRACT INFECTION, UNSPECIFIED TYPE: Primary | ICD-10-CM

## 2019-11-15 PROCEDURE — 99213 OFFICE O/P EST LOW 20 MIN: CPT

## 2019-11-15 PROCEDURE — 99214 OFFICE O/P EST MOD 30 MIN: CPT

## 2019-11-15 RX ORDER — BENZONATATE 100 MG/1
100 CAPSULE ORAL 3 TIMES DAILY PRN
Qty: 30 CAPSULE | Refills: 0 | Status: SHIPPED | OUTPATIENT
Start: 2019-11-15 | End: 2019-12-15

## 2019-11-15 RX ORDER — METHYLPREDNISOLONE 4 MG/1
TABLET ORAL
Qty: 1 PACKAGE | Refills: 0 | Status: SHIPPED | OUTPATIENT
Start: 2019-11-15 | End: 2020-09-11

## 2019-11-15 RX ORDER — AZITHROMYCIN 250 MG/1
TABLET, FILM COATED ORAL
Qty: 1 PACKAGE | Refills: 0 | Status: SHIPPED | OUTPATIENT
Start: 2019-11-15 | End: 2019-11-20

## 2019-11-15 RX ORDER — GUAIFENESIN AND CODEINE PHOSPHATE 100; 10 MG/5ML; MG/5ML
5 SOLUTION ORAL EVERY 6 HOURS PRN
Qty: 120 ML | Refills: 0 | Status: SHIPPED | OUTPATIENT
Start: 2019-11-15 | End: 2019-11-29

## 2019-11-15 NOTE — ED PROVIDER NOTES
Patient presents with:  Cough/URI      HPI:     Radha Donald is a 62year old female with a past history of fibroids, asthma, osteoarthritis, GERD presents with a chief complaint of cough, postnasal drip, runny nose and facial pain and pressure.   Patient nontoxic in appearance. Exam as noted above. I discussed with the patient that based on examination symptoms can treat for upper respiratory infection.   Patient states she would like me to review her chart to see what Dr. Anny Carreno has given her in the past

## 2019-11-15 NOTE — ED INITIAL ASSESSMENT (HPI)
C/o persistent cough lasting for 3 weeks. OTC meds without relief. Not sleeping at night. No fever. No chest  Pain or SOB. C/o facial tenderness.

## 2020-01-11 RX ORDER — PANTOPRAZOLE SODIUM 40 MG/1
TABLET, DELAYED RELEASE ORAL
Qty: 90 TABLET | Refills: 1 | Status: SHIPPED | OUTPATIENT
Start: 2020-01-11 | End: 2020-09-17

## 2020-01-11 RX ORDER — LOSARTAN POTASSIUM 25 MG/1
TABLET ORAL
Qty: 90 TABLET | Refills: 1 | Status: SHIPPED | OUTPATIENT
Start: 2020-01-11 | End: 2020-09-11

## 2020-01-12 NOTE — TELEPHONE ENCOUNTER
Refill passed per [facility] protocol.   Requested Prescriptions   Pending Prescriptions Disp Refills   • LOSARTAN POTASSIUM 25 MG Oral Tab [Pharmacy Med Name: LOSARTAN 25MG TABLETS] 90 tablet 0     Sig: TAKE 1 TABLET(25 MG) BY MOUTH DAILY       Hypert

## 2020-06-17 NOTE — TELEPHONE ENCOUNTER
Refill passed per [facility] protocol.   Refill Protocol Appointment Criteria  · Appointment scheduled in the past 12 months or in the next 3 months  Recent Outpatient Visits            1 month ago Internal derangement of knee, left    [facility] Quality 226: Preventive Care And Screening: Tobacco Use: Screening And Cessation Intervention: Patient screened for tobacco use and is an ex/non-smoker Detail Level: Detailed Quality 130: Documentation Of Current Medications In The Medical Record: Current Medications Documented Quality 431: Preventive Care And Screening: Unhealthy Alcohol Use - Screening: Patient screened for unhealthy alcohol use using a single question and scores less than 2 times per year

## 2020-09-11 ENCOUNTER — TELEPHONE (OUTPATIENT)
Dept: INTERNAL MEDICINE CLINIC | Facility: CLINIC | Age: 65
End: 2020-09-11

## 2020-09-11 ENCOUNTER — OFFICE VISIT (OUTPATIENT)
Dept: INTERNAL MEDICINE CLINIC | Facility: CLINIC | Age: 65
End: 2020-09-11
Payer: COMMERCIAL

## 2020-09-11 VITALS
HEART RATE: 78 BPM | SYSTOLIC BLOOD PRESSURE: 136 MMHG | RESPIRATION RATE: 20 BRPM | BODY MASS INDEX: 36.62 KG/M2 | HEIGHT: 62 IN | DIASTOLIC BLOOD PRESSURE: 84 MMHG | WEIGHT: 199 LBS

## 2020-09-11 DIAGNOSIS — H66.001 NON-RECURRENT ACUTE SUPPURATIVE OTITIS MEDIA OF RIGHT EAR WITHOUT SPONTANEOUS RUPTURE OF TYMPANIC MEMBRANE: ICD-10-CM

## 2020-09-11 DIAGNOSIS — H60.592 OTHER NONINFECTIVE ACUTE OTITIS EXTERNA OF LEFT EAR: ICD-10-CM

## 2020-09-11 DIAGNOSIS — N83.201 CYST OF RIGHT OVARY: Primary | ICD-10-CM

## 2020-09-11 DIAGNOSIS — I10 ESSENTIAL HYPERTENSION: ICD-10-CM

## 2020-09-11 PROBLEM — R10.31 RIGHT GROIN PAIN: Status: RESOLVED | Noted: 2019-08-05 | Resolved: 2020-09-11

## 2020-09-11 PROBLEM — M79.651 RIGHT THIGH PAIN: Status: RESOLVED | Noted: 2019-08-05 | Resolved: 2020-09-11

## 2020-09-11 PROBLEM — M23.92 INTERNAL DERANGEMENT OF KNEE, LEFT: Status: RESOLVED | Noted: 2018-10-22 | Resolved: 2020-09-11

## 2020-09-11 PROBLEM — M25.562 ACUTE PAIN OF LEFT KNEE: Status: RESOLVED | Noted: 2018-10-22 | Resolved: 2020-09-11

## 2020-09-11 PROCEDURE — 99214 OFFICE O/P EST MOD 30 MIN: CPT | Performed by: INTERNAL MEDICINE

## 2020-09-11 PROCEDURE — 3075F SYST BP GE 130 - 139MM HG: CPT | Performed by: INTERNAL MEDICINE

## 2020-09-11 PROCEDURE — 3008F BODY MASS INDEX DOCD: CPT | Performed by: INTERNAL MEDICINE

## 2020-09-11 PROCEDURE — 3079F DIAST BP 80-89 MM HG: CPT | Performed by: INTERNAL MEDICINE

## 2020-09-11 RX ORDER — FLUTICASONE PROPIONATE 50 MCG
2 SPRAY, SUSPENSION (ML) NASAL DAILY
Qty: 1 BOTTLE | Refills: 0 | Status: SHIPPED | OUTPATIENT
Start: 2020-09-11 | End: 2020-10-06

## 2020-09-11 RX ORDER — CEFDINIR 300 MG/1
300 CAPSULE ORAL 2 TIMES DAILY
Qty: 14 CAPSULE | Refills: 0 | Status: SHIPPED | OUTPATIENT
Start: 2020-09-11 | End: 2020-11-10

## 2020-09-11 RX ORDER — LOSARTAN POTASSIUM 25 MG/1
25 TABLET ORAL DAILY
Qty: 90 TABLET | Refills: 1 | Status: SHIPPED | OUTPATIENT
Start: 2020-09-11 | End: 2021-03-09

## 2020-09-11 RX ORDER — NEOMYCIN SULFATE, POLYMYXIN B SULFATE, HYDROCORTISONE 3.5; 10000; 1 MG/ML; [USP'U]/ML; MG/ML
SOLUTION/ DROPS AURICULAR (OTIC)
Refills: 0 | Status: CANCELLED | OUTPATIENT
Start: 2020-09-11

## 2020-09-11 NOTE — TELEPHONE ENCOUNTER
Patient states she was told by Dr. Naomi Rodrigues today, that she would prescribe her an antibiotic for her ear. Informed patient there is currently no antibiotic in her chart, but that I would forward her message to Dr. Naomi Rodrigues.

## 2020-09-12 NOTE — PROGRESS NOTES
HPI:    Patient ID: Abdulkadir Kemp is a 60year old female. As for evaluation of discomfort of both ears    HPI  Patient reports that for last 2 weeks she has been bothered by increasing intermittent pain in the left ear, it feels like.   Popping crackling arm, Patient Position: Sitting, Cuff Size: large)   Pulse 78   Resp 20   Ht 5' 2\" (1.575 m)   Wt 199 lb (90.3 kg)   BMI 36.40 kg/m²    Physical Exam    Constitutional: She is oriented to person, place, and time.  She appears well-developed and well-nourish Imaging & Referrals:  US PELVIS W EV ([id number]=[id number])         FQ#[id number]

## 2020-09-17 RX ORDER — PANTOPRAZOLE SODIUM 40 MG/1
TABLET, DELAYED RELEASE ORAL
Qty: 90 TABLET | Refills: 1 | Status: SHIPPED | OUTPATIENT
Start: 2020-09-17 | End: 2021-04-01

## 2020-10-01 ENCOUNTER — HOSPITAL ENCOUNTER (OUTPATIENT)
Dept: ULTRASOUND IMAGING | Age: 65
Discharge: HOME | End: 2020-10-01
Attending: INTERNAL MEDICINE
Payer: COMMERCIAL

## 2020-10-01 DIAGNOSIS — N83.201 CYST OF RIGHT OVARY: ICD-10-CM

## 2020-10-01 PROCEDURE — 76856 US EXAM PELVIC COMPLETE: CPT | Performed by: INTERNAL MEDICINE

## 2020-10-01 PROCEDURE — 76830 TRANSVAGINAL US NON-OB: CPT | Performed by: INTERNAL MEDICINE

## 2020-10-06 DIAGNOSIS — H60.592 OTHER NONINFECTIVE ACUTE OTITIS EXTERNA OF LEFT EAR: ICD-10-CM

## 2020-10-06 RX ORDER — FLUTICASONE PROPIONATE 50 MCG
2 SPRAY, SUSPENSION (ML) NASAL DAILY
Qty: 16 G | Refills: 2 | Status: SHIPPED | OUTPATIENT
Start: 2020-10-06 | End: 2020-12-26

## 2020-10-29 ENCOUNTER — NURSE TRIAGE (OUTPATIENT)
Dept: INTERNAL MEDICINE CLINIC | Facility: CLINIC | Age: 65
End: 2020-10-29

## 2020-10-29 NOTE — TELEPHONE ENCOUNTER
Noted, thanks Patient was seen in ER for COPD she was given prednisone and it spiked her sugar 500+  Letty Jensen NP would like for you to adjust her insulin in order for patient to take prednisone.  She also stated that patient would need a refill for her nebulizer medication please advise

## 2020-10-29 NOTE — TELEPHONE ENCOUNTER
Action Requested: Summary for Provider     []  Critical Lab, Recommendations Needed  [] Need Additional Advice  []   FYI    []   Need Orders  [x] Need Medications Sent to Pharmacy  []  Other     SUMMARY: per patient she has been experiencing frequent uri

## 2020-11-10 ENCOUNTER — TELEPHONE (OUTPATIENT)
Dept: OBGYN CLINIC | Facility: CLINIC | Age: 65
End: 2020-11-10

## 2020-11-10 ENCOUNTER — OFFICE VISIT (OUTPATIENT)
Dept: OBGYN CLINIC | Facility: CLINIC | Age: 65
End: 2020-11-10
Payer: COMMERCIAL

## 2020-11-10 VITALS
BODY MASS INDEX: 37 KG/M2 | HEART RATE: 73 BPM | SYSTOLIC BLOOD PRESSURE: 143 MMHG | DIASTOLIC BLOOD PRESSURE: 83 MMHG | WEIGHT: 201.19 LBS

## 2020-11-10 DIAGNOSIS — D21.9 FIBROID: ICD-10-CM

## 2020-11-10 DIAGNOSIS — N83.201 CYST OF RIGHT OVARY: ICD-10-CM

## 2020-11-10 DIAGNOSIS — R93.89 ENDOMETRIAL THICKENING ON ULTRASOUND: Primary | ICD-10-CM

## 2020-11-10 PROCEDURE — 3079F DIAST BP 80-89 MM HG: CPT | Performed by: OBSTETRICS & GYNECOLOGY

## 2020-11-10 PROCEDURE — 99203 OFFICE O/P NEW LOW 30 MIN: CPT | Performed by: OBSTETRICS & GYNECOLOGY

## 2020-11-10 PROCEDURE — 3077F SYST BP >= 140 MM HG: CPT | Performed by: OBSTETRICS & GYNECOLOGY

## 2020-11-10 PROCEDURE — 99072 ADDL SUPL MATRL&STAF TM PHE: CPT | Performed by: OBSTETRICS & GYNECOLOGY

## 2020-11-11 ENCOUNTER — TELEPHONE (OUTPATIENT)
Dept: OBGYN CLINIC | Facility: CLINIC | Age: 65
End: 2020-11-11

## 2020-11-11 NOTE — TELEPHONE ENCOUNTER
Schedule endosee with possible endometrial biopsy for this patient:  **Do referral if needed. **    Timing: anytime    Diagnosis: thickened endometrial stripe    Blood tests: n/a    Medication prep: n/a    Pain med prep: tylenol one hour before    Procedure

## 2020-11-11 NOTE — TELEPHONE ENCOUNTER
LMTCB.   (Per [facility], if pt can take the 11:50am appt this Friday we can book it in that 10 minute appt)

## 2020-11-11 NOTE — TELEPHONE ENCOUNTER
Patient called stating she cannot due the procedure on the 13th and will need to reschedule.  No Fridays and if possible after 3pm.     Please advise

## 2020-11-11 NOTE — TELEPHONE ENCOUNTER
Scheduled Endosee on Fri, 11-13-20 at 11:50am.  States since she is unable to take ibuprofen and tylenol itself does not offer much pain relief she may take 1/2 tab of Norco that she uses for her knee pain.   Advised she needs someone to drive her if she ta

## 2020-11-11 NOTE — PROGRESS NOTES
Corbin Mackenzie is a 60year old female  No LMP recorded. Patient is postmenopausal. Patient presents with:  Gyn Problem: referred by PCP, u/s shows increased endometrial thickness. had hip pain & MRI showed ? shadowing on ovary thus pelvic u/s ordere Smoking status: Former Smoker        Packs/day: 0.25        Years: 10.00        Pack years: 2.5        Types: Cigarettes      Smokeless tobacco: Never Used    Substance and Sexual Activity      Alcohol use: Yes        Comment: occassional      Drug use: No night sweats, hot flashes  Gastrointestinal:  denies abdominal pain, diarrhea or constipation  Genitourinary:    denies dysuria, abnormal vaginal discharge, vaginal itching  Musculoskeletal:   denies back pain.   Neurological:    denies headaches, extremity Length: 1.69 cm             Left Ovary Height: 1.56 cm             Left Ovary Width: 1.44 cm          CONCLUSION:   1. There has been a minimal increase in endometrial thickness as compared to prior study.   For postmenopausal female endometrium should be n

## 2020-12-26 DIAGNOSIS — H60.592 OTHER NONINFECTIVE ACUTE OTITIS EXTERNA OF LEFT EAR: ICD-10-CM

## 2020-12-26 RX ORDER — FLUTICASONE PROPIONATE 50 MCG
SPRAY, SUSPENSION (ML) NASAL
Qty: 16 G | Refills: 2 | Status: SHIPPED | OUTPATIENT
Start: 2020-12-26 | End: 2021-04-04

## 2020-12-31 ENCOUNTER — OFFICE VISIT (OUTPATIENT)
Dept: OBGYN CLINIC | Facility: CLINIC | Age: 65
End: 2020-12-31
Payer: COMMERCIAL

## 2020-12-31 VITALS — HEART RATE: 76 BPM | DIASTOLIC BLOOD PRESSURE: 79 MMHG | SYSTOLIC BLOOD PRESSURE: 149 MMHG

## 2020-12-31 DIAGNOSIS — N81.89 PELVIC RELAXATION: Primary | ICD-10-CM

## 2020-12-31 PROCEDURE — 57160 INSERT PESSARY/OTHER DEVICE: CPT | Performed by: OBSTETRICS & GYNECOLOGY

## 2020-12-31 PROCEDURE — 3078F DIAST BP <80 MM HG: CPT | Performed by: OBSTETRICS & GYNECOLOGY

## 2020-12-31 PROCEDURE — 3077F SYST BP >= 140 MM HG: CPT | Performed by: OBSTETRICS & GYNECOLOGY

## 2020-12-31 PROCEDURE — A4561 PESSARY RUBBER, ANY TYPE: HCPCS | Performed by: OBSTETRICS & GYNECOLOGY

## 2020-12-31 NOTE — PROCEDURES
Pessary    Verbal Consent obtained. Procedure discussed with patient in detail including indication, risk, benefits, alternatives and complications.       Procedure:  Pessary Type: Ring with support  Size: 2.75    Rectocele Grade 2  Cystocele Grade 2  Ente

## 2021-01-11 ENCOUNTER — OFFICE VISIT (OUTPATIENT)
Dept: OBGYN CLINIC | Facility: CLINIC | Age: 66
End: 2021-01-11
Payer: COMMERCIAL

## 2021-01-11 DIAGNOSIS — R93.89 INCREASED ENDOMETRIAL STRIPE THICKNESS: ICD-10-CM

## 2021-01-11 DIAGNOSIS — R93.89 ENDOMETRIAL THICKENING ON ULTRASOUND: Primary | ICD-10-CM

## 2021-01-11 PROCEDURE — 3077F SYST BP >= 140 MM HG: CPT | Performed by: OBSTETRICS & GYNECOLOGY

## 2021-01-11 PROCEDURE — 3079F DIAST BP 80-89 MM HG: CPT | Performed by: OBSTETRICS & GYNECOLOGY

## 2021-01-11 PROCEDURE — 58558 HYSTEROSCOPY BIOPSY: CPT | Performed by: OBSTETRICS & GYNECOLOGY

## 2021-01-14 NOTE — PROCEDURES
Endosee Procedure with embx     LMP: n/a  Birth control method(s) used:     Consent signed. Procedure discussed with the patient in detail including indication, risks, benefits, alternatives and complications.     Indication:  thickened endometrial st Patient ambulated approximately 200 feet. SPO2 at rest 96%. SPO2 while ambulating 95%. SPO2 recovered to 98% after sitting.

## 2021-01-15 ENCOUNTER — TELEPHONE (OUTPATIENT)
Dept: OBGYN CLINIC | Facility: CLINIC | Age: 66
End: 2021-01-15

## 2021-01-15 NOTE — TELEPHONE ENCOUNTER
Patient calling stating her weight is recorded wrong in my chart.  She weighed in the last visit at 205.0. please change

## 2021-01-18 VITALS
DIASTOLIC BLOOD PRESSURE: 80 MMHG | SYSTOLIC BLOOD PRESSURE: 153 MMHG | HEART RATE: 69 BPM | WEIGHT: 205 LBS | BODY MASS INDEX: 37 KG/M2

## 2021-03-09 RX ORDER — LOSARTAN POTASSIUM 25 MG/1
TABLET ORAL
Qty: 90 TABLET | Refills: 1 | Status: SHIPPED | OUTPATIENT
Start: 2021-03-09 | End: 2021-10-02

## 2021-04-01 RX ORDER — PANTOPRAZOLE SODIUM 40 MG/1
TABLET, DELAYED RELEASE ORAL
Qty: 90 TABLET | Refills: 1 | Status: SHIPPED | OUTPATIENT
Start: 2021-04-01 | End: 2021-11-15

## 2021-04-03 DIAGNOSIS — H60.592 OTHER NONINFECTIVE ACUTE OTITIS EXTERNA OF LEFT EAR: ICD-10-CM

## 2021-04-04 RX ORDER — FLUTICASONE PROPIONATE 50 MCG
SPRAY, SUSPENSION (ML) NASAL
Qty: 16 G | Refills: 2 | Status: SHIPPED | OUTPATIENT
Start: 2021-04-04 | End: 2021-07-12

## 2021-07-12 DIAGNOSIS — H60.592 OTHER NONINFECTIVE ACUTE OTITIS EXTERNA OF LEFT EAR: ICD-10-CM

## 2021-07-12 RX ORDER — FLUTICASONE PROPIONATE 50 MCG
SPRAY, SUSPENSION (ML) NASAL
Qty: 16 G | Refills: 2 | Status: SHIPPED | OUTPATIENT
Start: 2021-07-12

## 2021-09-28 ENCOUNTER — HOSPITAL ENCOUNTER (OUTPATIENT)
Dept: GENERAL RADIOLOGY | Age: 66
Discharge: HOME | End: 2021-09-28
Attending: INTERNAL MEDICINE
Payer: COMMERCIAL

## 2021-09-28 ENCOUNTER — OFFICE VISIT (OUTPATIENT)
Dept: INTERNAL MEDICINE CLINIC | Facility: CLINIC | Age: 66
End: 2021-09-28
Payer: COMMERCIAL

## 2021-09-28 VITALS
DIASTOLIC BLOOD PRESSURE: 90 MMHG | RESPIRATION RATE: 20 BRPM | HEART RATE: 88 BPM | BODY MASS INDEX: 37.35 KG/M2 | WEIGHT: 210.81 LBS | SYSTOLIC BLOOD PRESSURE: 142 MMHG | HEIGHT: 63 IN

## 2021-09-28 DIAGNOSIS — R05.9 COUGH: ICD-10-CM

## 2021-09-28 DIAGNOSIS — R68.89 CONGESTION OF THROAT: ICD-10-CM

## 2021-09-28 DIAGNOSIS — I10 ESSENTIAL HYPERTENSION: ICD-10-CM

## 2021-09-28 DIAGNOSIS — Z12.31 BREAST CANCER SCREENING BY MAMMOGRAM: ICD-10-CM

## 2021-09-28 DIAGNOSIS — Z00.00 PHYSICAL EXAM, ANNUAL: Primary | ICD-10-CM

## 2021-09-28 DIAGNOSIS — Z00.00 PHYSICAL EXAM, ANNUAL: ICD-10-CM

## 2021-09-28 PROCEDURE — 3008F BODY MASS INDEX DOCD: CPT | Performed by: INTERNAL MEDICINE

## 2021-09-28 PROCEDURE — 3077F SYST BP >= 140 MM HG: CPT | Performed by: INTERNAL MEDICINE

## 2021-09-28 PROCEDURE — 99397 PER PM REEVAL EST PAT 65+ YR: CPT | Performed by: INTERNAL MEDICINE

## 2021-09-28 PROCEDURE — 3080F DIAST BP >= 90 MM HG: CPT | Performed by: INTERNAL MEDICINE

## 2021-09-28 PROCEDURE — 71046 X-RAY EXAM CHEST 2 VIEWS: CPT | Performed by: INTERNAL MEDICINE

## 2021-09-28 RX ORDER — CHOLECALCIFEROL (VITAMIN D3) 125 MCG
10 CAPSULE ORAL AS NEEDED
COMMUNITY

## 2021-09-28 NOTE — PROGRESS NOTES
Subjective:   Patient ID: Ewa Johnson is a 73year old female. Presents for physical exam.    HPI  Patient reports that overall she has been feeling fair, concerned she has cough dry for about 3 months now at times produces phlegm.   Living with a postn LOSARTAN POTASSIUM 25 MG Oral Tab TAKE 1 TABLET(25 MG) BY MOUTH DAILY 90 tablet 1     Allergies:  Clear Eyes Seasonal     Coughing, WHEEZING  /90   Pulse 88   Resp 20   Ht 5' 3\" (1.6 m)   Wt 210 lb 12.8 oz (95.6 kg)   BMI 37.34 kg/m²   Physical Exam exam, annual  (primary encounter diagnosis) we discussed the importance of healthy diet, portion control, low carbohydrate diet, encourage regular physical activities, will check labs  Cough chest x-ray PA and lateral, see ENT specialist  Breast cancer scr

## 2021-10-02 RX ORDER — LOSARTAN POTASSIUM 25 MG/1
TABLET ORAL
Qty: 90 TABLET | Refills: 1 | Status: SHIPPED | OUTPATIENT
Start: 2021-10-02 | End: 2021-12-01

## 2021-10-13 ENCOUNTER — OFFICE VISIT (OUTPATIENT)
Dept: OTOLARYNGOLOGY | Facility: CLINIC | Age: 66
End: 2021-10-13
Payer: COMMERCIAL

## 2021-10-13 ENCOUNTER — NURSE ONLY (OUTPATIENT)
Dept: INTERNAL MEDICINE CLINIC | Facility: CLINIC | Age: 66
End: 2021-10-13
Payer: COMMERCIAL

## 2021-10-13 ENCOUNTER — LAB ENCOUNTER (OUTPATIENT)
Dept: LAB | Age: 66
End: 2021-10-13
Attending: INTERNAL MEDICINE
Payer: COMMERCIAL

## 2021-10-13 VITALS — DIASTOLIC BLOOD PRESSURE: 79 MMHG | HEART RATE: 70 BPM | SYSTOLIC BLOOD PRESSURE: 148 MMHG

## 2021-10-13 VITALS — WEIGHT: 210 LBS | HEIGHT: 63 IN | BODY MASS INDEX: 37.21 KG/M2

## 2021-10-13 DIAGNOSIS — Z01.30 BP CHECK: Primary | ICD-10-CM

## 2021-10-13 DIAGNOSIS — J30.9 ALLERGIC RHINITIS, UNSPECIFIED SEASONALITY, UNSPECIFIED TRIGGER: ICD-10-CM

## 2021-10-13 DIAGNOSIS — R05.9 COUGH: Primary | ICD-10-CM

## 2021-10-13 PROCEDURE — 99213 OFFICE O/P EST LOW 20 MIN: CPT | Performed by: SPECIALIST

## 2021-10-13 PROCEDURE — 31231 NASAL ENDOSCOPY DX: CPT | Performed by: SPECIALIST

## 2021-10-13 PROCEDURE — 3008F BODY MASS INDEX DOCD: CPT | Performed by: SPECIALIST

## 2021-10-13 PROCEDURE — 3077F SYST BP >= 140 MM HG: CPT | Performed by: INTERNAL MEDICINE

## 2021-10-13 PROCEDURE — 3078F DIAST BP <80 MM HG: CPT | Performed by: INTERNAL MEDICINE

## 2021-10-13 RX ORDER — MONTELUKAST SODIUM 10 MG/1
10 TABLET ORAL NIGHTLY
Qty: 30 TABLET | Refills: 3 | Status: SHIPPED | OUTPATIENT
Start: 2021-10-13

## 2021-10-13 NOTE — PROGRESS NOTES
Lian Solis is a 73year old female. Patient presents with:  Cough: pt c/o chronic cough for couple w/ post nasal drip of months, pt has been taking allergy medications has not worked. HPI:   With a cough on Claritin and Mucinex DM.     Current Outpa any unusual skin lesions or rashes  RESPIRATORY: denies shortness of breath with exertion  NEURO: denies headaches    EXAM:   Ht 5' 3\" (1.6 m)   Wt 210 lb (95.3 kg)   BMI 37.20 kg/m²   System Details   Skin Inspection - Normal.   Constitutional Overall ap cough variant asthma. The patient indicates understanding of these issues and agrees to the plan.       Sylvester Parra MD  10/13/2021  1:23 PM

## 2021-10-13 NOTE — PATIENT INSTRUCTIONS
I added a Singulair in the evening to the Claritin in the morning for you. Please call me in 3 weeks time, sooner if problems.

## 2021-10-13 NOTE — PROGRESS NOTES
Pt arrived at the office for a nurse visit for a bp check, and blood pressure machine calibration. Pt verified name and . Reviewed blood pressure medication with pt.   She stts that she is currently taking losartan potassium 25 mg two tablets at night

## 2021-11-04 ENCOUNTER — LAB ENCOUNTER (OUTPATIENT)
Dept: LAB | Age: 66
End: 2021-11-04
Attending: INTERNAL MEDICINE
Payer: COMMERCIAL

## 2021-11-04 DIAGNOSIS — Z00.00 PHYSICAL EXAM, ANNUAL: ICD-10-CM

## 2021-11-04 PROCEDURE — 80053 COMPREHEN METABOLIC PANEL: CPT

## 2021-11-04 PROCEDURE — 84443 ASSAY THYROID STIM HORMONE: CPT

## 2021-11-04 PROCEDURE — 80061 LIPID PANEL: CPT

## 2021-11-04 PROCEDURE — 36415 COLL VENOUS BLD VENIPUNCTURE: CPT

## 2021-11-04 PROCEDURE — 85025 COMPLETE CBC W/AUTO DIFF WBC: CPT

## 2021-11-08 ENCOUNTER — HOSPITAL ENCOUNTER (OUTPATIENT)
Dept: MAMMOGRAPHY | Age: 66
Discharge: HOME | End: 2021-11-08
Attending: INTERNAL MEDICINE
Payer: COMMERCIAL

## 2021-11-08 DIAGNOSIS — Z12.31 BREAST CANCER SCREENING BY MAMMOGRAM: ICD-10-CM

## 2021-11-08 PROCEDURE — 77067 SCR MAMMO BI INCL CAD: CPT | Performed by: INTERNAL MEDICINE

## 2021-11-08 PROCEDURE — 77063 BREAST TOMOSYNTHESIS BI: CPT | Performed by: INTERNAL MEDICINE

## 2021-11-15 RX ORDER — PANTOPRAZOLE SODIUM 40 MG/1
40 TABLET, DELAYED RELEASE ORAL
Qty: 90 TABLET | Refills: 1 | Status: SHIPPED | OUTPATIENT
Start: 2021-11-15

## 2021-11-15 NOTE — TELEPHONE ENCOUNTER
Pts pharmacy called for a refill request on the following medication    PANTOPRAZOLE SODIUM 40 MG Oral Tab EC 90 tablet 1 4/1/2021    Sig: Brissa Gr 1 TABLET BY MOUTH EVERY MORNING BEFORE BREAKFAST     Route:   (none)     Order #:   [id number]

## 2021-11-15 NOTE — TELEPHONE ENCOUNTER
Refill passed per [facility], [facility] protocol.     Requested Prescriptions   Pending Prescriptions Disp Refills    pantoprazole 40 MG Oral Tab EC 90 tablet 1     Sig: Take 1 tablet (40 mg total) by mouth before breakfast.        Gastrointestional Medication Pr

## 2021-11-30 RX ORDER — LOSARTAN POTASSIUM 25 MG/1
TABLET ORAL
Qty: 90 TABLET | Refills: 1 | OUTPATIENT
Start: 2021-11-30

## 2021-11-30 NOTE — TELEPHONE ENCOUNTER
Pt is calling for status of her medication refill request. Pt states that she is out of medication. Per the patient the doctor normally sends her a 90 day supply. Pt states that the pharmacy has on record 10 pills.  Pt states that she did request 10 pills o

## 2021-12-01 RX ORDER — LOSARTAN POTASSIUM 25 MG/1
75 TABLET ORAL DAILY
Qty: 270 TABLET | Refills: 1 | Status: SHIPPED | OUTPATIENT
Start: 2021-12-01

## 2021-12-01 NOTE — TELEPHONE ENCOUNTER
The patient stated she was taking the Losartan 50 mg and at the 9/28/21 office. Per the patient was increased to 75 mg when she came into the clinic to have her blood pressure cuff calibrated at 10/13/21. Updated to 75 mg daily. Pended the refill.

## 2022-02-22 ENCOUNTER — TELEPHONE (OUTPATIENT)
Dept: INTERNAL MEDICINE CLINIC | Facility: CLINIC | Age: 67
End: 2022-02-22

## 2022-02-22 NOTE — TELEPHONE ENCOUNTER
Patient called because she recently switched insurances to [employer]. She received a letter stating losartan isn't preferred and that there is a quantity limit. She said the letter didn't show preferred alternatives. Please advise on an alternative ?

## 2022-02-22 NOTE — TELEPHONE ENCOUNTER
Sent prescription for generic Avapro 150 mg once a day, left message for the patient that we will work with insurance formulary and change medication she needs to start checking blood pressure at home and come for follow-up appointment to 3 weeks after starting new medication.

## 2022-03-15 RX ORDER — MONTELUKAST SODIUM 10 MG/1
TABLET ORAL
Qty: 30 TABLET | Refills: 3 | Status: SHIPPED | OUTPATIENT
Start: 2022-03-15

## 2022-05-09 RX ORDER — PANTOPRAZOLE SODIUM 40 MG/1
40 TABLET, DELAYED RELEASE ORAL
Qty: 90 TABLET | Refills: 1 | Status: SHIPPED | OUTPATIENT
Start: 2022-05-09 | End: 2023-07-21

## 2022-05-09 NOTE — TELEPHONE ENCOUNTER
Refill passed per [employer] protocol.   Requested Prescriptions   Pending Prescriptions Disp Refills    PANTOPRAZOLE 40 MG Oral Tab EC [Pharmacy Med Name: PANTOPRAZOLE 40MG TABLETS] 90 tablet 1     Sig: TAKE 1 TABLET(40 MG) BY MOUTH BEFORE BREAKFAST        Gastrointestional Medication Protocol Passed - 5/9/2022  1:29 PM        Passed - Appointment in past 12 or next 3 months             Recent Outpatient Visits              6 months ago BP check    [facility], [facility], [address] [place] [address] Only    6 months ago Cough    [facility] Pearl Ashby [facility]    Office Visit    7 months ago Physical exam, annual    [address] [facility], Michelle Norman MD    Office Visit    1 year ago Endometrial thickening on ultrasound    [facility], [address], Malou Tyler MD    Office Visit    1 year ago Pelvic relaxation    [facility] for Yosvany Yung MD    Office Visit

## 2022-05-26 RX ORDER — IRBESARTAN 150 MG/1
150 TABLET ORAL NIGHTLY
Qty: 90 TABLET | Refills: 0 | Status: SHIPPED | OUTPATIENT
Start: 2022-05-26

## 2022-05-26 NOTE — TELEPHONE ENCOUNTER
Please review. Protocol failed / No protocol. 90 day refill given on 05/26/22, appointment needed for further refills.       Requested Prescriptions   Pending Prescriptions Disp Refills    IRBESARTAN 150 MG Oral Tab [Pharmacy Med Name: IRBESARTAN 150MG TABLETS] 90 tablet 0     Sig: TAKE 1 TABLET(150 MG) BY MOUTH EVERY NIGHT        Hypertensive Medications Protocol Failed - 5/25/2022 12:13 PM        Failed - Appointment in past 6 or next 3 months        Passed - CMP or BMP in past 12 months        Passed - GFR Non- > 50     Lab Results   Component Value Date    GFRNAA 91 11/04/2021                           Recent Outpatient Visits              7 months ago BP check    [address] Alexis Paulette, [address] [place], [place] [address]    Nurse Only    7 months ago Cough    [facility] for Jackelyn Hayward MD    Office Visit    8 months ago Physical exam, annual    [address] Joan Caldwell [address] [place], [place] [address]Gil[place], MD    Office Visit    1 year ago Endometrial thickening on ultrasound    [facility], [address], [place], Lisseth Sabillon MD    Office Visit    1 year ago Pelvic relaxation    [facility] for Nerissa Rios, Lisseth Sabillon MD    Office Visit

## 2022-06-15 ENCOUNTER — OFFICE VISIT (OUTPATIENT)
Dept: GASTROENTEROLOGY | Facility: CLINIC | Age: 67
End: 2022-06-15
Payer: MEDICARE

## 2022-06-15 VITALS
BODY MASS INDEX: 39.16 KG/M2 | HEIGHT: 63 IN | DIASTOLIC BLOOD PRESSURE: 81 MMHG | WEIGHT: 221 LBS | SYSTOLIC BLOOD PRESSURE: 138 MMHG | HEART RATE: 83 BPM

## 2022-06-15 DIAGNOSIS — R10.13 EPIGASTRIC PAIN: ICD-10-CM

## 2022-06-15 DIAGNOSIS — R11.0 NAUSEA: Primary | ICD-10-CM

## 2022-06-15 PROCEDURE — 99203 OFFICE O/P NEW LOW 30 MIN: CPT | Performed by: NURSE PRACTITIONER

## 2022-06-15 PROCEDURE — 3008F BODY MASS INDEX DOCD: CPT | Performed by: NURSE PRACTITIONER

## 2022-06-15 PROCEDURE — 3079F DIAST BP 80-89 MM HG: CPT | Performed by: NURSE PRACTITIONER

## 2022-06-15 PROCEDURE — 3075F SYST BP GE 130 - 139MM HG: CPT | Performed by: NURSE PRACTITIONER

## 2022-06-15 RX ORDER — CHLORAL HYDRATE 500 MG
CAPSULE ORAL AS DIRECTED
COMMUNITY

## 2022-06-15 NOTE — PATIENT INSTRUCTIONS
-Increase pantoprazole to 40 mg twice daily  -GERD lifestyle/dietary modifications  -Follow-up with new or ongoing symptoms        Avoid Heartburn Triggers  - Laying down after meals (sit upright for at least 2-3 hours after eating meals)  - Eating late at night  - Large meals (serving sizes proportional to palm of hand)  - Spicy/fatty/fried/greasy/acidic/citrus foods (fast food, orange/lime/lemon/red sauces/tomatoes, peppermint)  - Excessive caffeine   - Alcohol (especially wine)  - Tight clothing/belts (if it leaves marks/indentations in the skin, it is likely too tight)  - Avoid the following medications: NSAIDs (aspirin, ibuprofen, Motrin, Aleve, Naproxen)  - Smoking    Try:  - Raising the head of the bed approximately 30 degrees  - Antacids (Tums)/ H2 blockers (Zantac/Pepcid) can help with symptoms as needed   - Identifying your triggers  - Keeping a food diary of your symptoms and what you consumed that day    - Weight loss/exercise

## 2022-06-28 ENCOUNTER — OFFICE VISIT (OUTPATIENT)
Dept: PODIATRY CLINIC | Facility: CLINIC | Age: 67
End: 2022-06-28
Payer: MEDICARE

## 2022-06-28 ENCOUNTER — OFFICE VISIT (OUTPATIENT)
Dept: RHEUMATOLOGY | Facility: CLINIC | Age: 67
End: 2022-06-28
Payer: MEDICARE

## 2022-06-28 VITALS
BODY MASS INDEX: 39 KG/M2 | DIASTOLIC BLOOD PRESSURE: 80 MMHG | HEIGHT: 63 IN | HEART RATE: 81 BPM | SYSTOLIC BLOOD PRESSURE: 148 MMHG

## 2022-06-28 DIAGNOSIS — M20.41 HAMMERTOES OF BOTH FEET: ICD-10-CM

## 2022-06-28 DIAGNOSIS — M20.42 HAMMERTOES OF BOTH FEET: ICD-10-CM

## 2022-06-28 DIAGNOSIS — M65.341 ACQUIRED TRIGGER FINGER OF RIGHT RING FINGER: ICD-10-CM

## 2022-06-28 DIAGNOSIS — M79.671 BILATERAL FOOT PAIN: ICD-10-CM

## 2022-06-28 DIAGNOSIS — M15.9 PRIMARY OSTEOARTHRITIS INVOLVING MULTIPLE JOINTS: Primary | ICD-10-CM

## 2022-06-28 DIAGNOSIS — L60.3 ONYCHODYSTROPHY: ICD-10-CM

## 2022-06-28 DIAGNOSIS — M21.621 TAILOR'S BUNION OF BOTH FEET: Primary | ICD-10-CM

## 2022-06-28 DIAGNOSIS — M79.672 BILATERAL FOOT PAIN: ICD-10-CM

## 2022-06-28 DIAGNOSIS — L60.0 INGROWN TOENAIL OF BOTH FEET: ICD-10-CM

## 2022-06-28 DIAGNOSIS — M21.622 TAILOR'S BUNION OF BOTH FEET: Primary | ICD-10-CM

## 2022-06-28 PROCEDURE — 1125F AMNT PAIN NOTED PAIN PRSNT: CPT | Performed by: INTERNAL MEDICINE

## 2022-06-28 PROCEDURE — 99204 OFFICE O/P NEW MOD 45 MIN: CPT | Performed by: INTERNAL MEDICINE

## 2022-06-28 PROCEDURE — 99203 OFFICE O/P NEW LOW 30 MIN: CPT | Performed by: PODIATRIST

## 2022-06-28 PROCEDURE — 3077F SYST BP >= 140 MM HG: CPT | Performed by: INTERNAL MEDICINE

## 2022-06-28 PROCEDURE — 1126F AMNT PAIN NOTED NONE PRSNT: CPT | Performed by: PODIATRIST

## 2022-06-28 PROCEDURE — 3079F DIAST BP 80-89 MM HG: CPT | Performed by: INTERNAL MEDICINE

## 2022-06-28 NOTE — PROGRESS NOTES
Dear Reanrd Perkins:    I saw your patient Dylan Engs in consultation this afternoon at your request, regarding diffuse arthritis. She is concerned about psoriatic arthritis. As you know, she is a 81-year-old woman who since about age 62 has had worsening arthritis. It especially has been in her hands and left knee. She had left knee arthroscopic surgery for meniscal tear in January of 2018, which helped. She had a lumbar laminectomy and fusion in January of 2018, that worked very well. Lumbar spine x-rays at [facility] January of 2018 showed L2-5 laminectomy and L4-5 fusion. Chest x-ray September of 2021 showed scoliosis and osteoarthritic changes. Labs November of 2021 showed normal CBC, CMP, and TSH. Her joints hurt diffusely, including her knees, ankles, thumb bases, wrists, elbows.  strength is weakening. She has a right ring finger that has been triggering for the last month, which makes it hard to use her hand. She lost 50 pounds before COVID, and has gained 50 pounds back. She sleeps well. She is refreshed on awakening, and she has good energy. When she gets out of bed, she is not worse. Her joints are not more swollen. It is always hard to make tight fists and  objects. Her legs and feet are stiff. She has bunionette deformities. No history of sausage digits. She has flares of what she calls 'reptile skin', with dry and scaly areas, presently over her right ulnar process area. It can be over her elbows. When this gets worse, her joint pains seem to get worse. She has never seen a dermatologist, or been diagnosed with psoriasis. Past medical history:  She had Bell's palsy during her pregnancy and still has some left face atrophy. She is now getting some Botox for left upper eyelid issue. She has had stomach ulcers. She is felt to have acid reflux. She has asthma from stress. She has hypertension, obesity. She had a D&C for fibroids.   She had a left wrist ganglion removed in 1989.  She had a tonsillectomy. She is on vitamin C, vitamin D, Flonase, irbesartan, losartan, Singulair, multivitamin, omega-3 fatty acids, pantoprazole. No drug allergies. Family history:  Her mother and maternal grandmother had hands just like hers with bony enlargement of her PIP and DIP joints with subluxations. She does not know of any autoimmune disorders. Social history:  She is  with 4 children. She is retired. She worked for the [employer] No cigarettes for many years. Occasional wine. Decaffeinated coffee. She gardens and stays active. Review of systems:  No fevers, chills, sweats, anorexia, weight loss, sun sensitive rash. She lost her hair years ago and uses awake. 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 No internal inflammation, oral or nasal ulcers, of adenopathy. No shortness of breath or chest pain. She does not feel acid reflux. She feels nauseated. No constipation or diarrhea, recent blood in her stools. No trouble urinating. Her eyes are watery. No dry mouth. No Raynaud's or headache. Physical exam:  Pleasant woman in no acute distress. Blood pressure 148/80, pulse 81, height 5' 3\" (1.6 m). There is a dry scaly patch over her right ulnar styloid process. Nothing about her elbows presently. She is wearing a wig. No conjunctival injection. No nasal oral lesions. No cervical adenopathy. Lungs clear. S1 and S2 regular. Abdomen without hepatosplenomegaly or tenderness. Normal bowel sounds. No lower extremity edema. Cervical spine motion is mildly limited. Surgical scar over her lumbar spine with mild limited flexion. Shoulders move well. Elbows flex and extend fully without synovitis. Wrist extension is limited on the left. She has squaring of her thumb bases and Heberden's and Lou's nodes across her hands.  strength is good. Hips move well.   Crepitance with flexion and extension of especially of her left knee without significant effusions. Ankles move well. She has bilateral bunionette deformities. Assessment and plan:    1. Diffuse hereditary osteoarthritis. She was given the up-to-date articles on osteoarthritis, and it was discussed. She can take Tylenol, up to 3000 mg a day. She may be interested in physical therapy on return. 2.  Right fourth trigger finger. She will schedule a follow-up appointment for an injection. 3.  Skin rash. Possible psoriasis. She may want to see a dermatologist for this. I reassured her that she does not have psoriatic arthritis    4. History of stomach ulcers and bleed, requiring packed red blood cells. 5.  History of Bell's palsy, with residual left face atrophy. She is trying Botox for her left eyelid. 6.  Status post left extensor wrist ganglion cyst removal.      Thank you for the opportunity to participate in her care. Sincerely,      Cindy Baca MD   Rheumatology.

## 2022-07-01 ENCOUNTER — HOSPITAL ENCOUNTER (OUTPATIENT)
Dept: GENERAL RADIOLOGY | Age: 67
Discharge: HOME | End: 2022-07-01
Attending: PODIATRIST
Payer: MEDICARE

## 2022-07-01 DIAGNOSIS — M21.622 TAILOR'S BUNION OF BOTH FEET: ICD-10-CM

## 2022-07-01 DIAGNOSIS — M21.621 TAILOR'S BUNION OF BOTH FEET: ICD-10-CM

## 2022-07-01 DIAGNOSIS — M20.42 HAMMERTOES OF BOTH FEET: ICD-10-CM

## 2022-07-01 DIAGNOSIS — M20.41 HAMMERTOES OF BOTH FEET: ICD-10-CM

## 2022-07-01 PROCEDURE — 73630 X-RAY EXAM OF FOOT: CPT | Performed by: PODIATRIST

## 2022-07-12 ENCOUNTER — OFFICE VISIT (OUTPATIENT)
Dept: RHEUMATOLOGY | Facility: CLINIC | Age: 67
End: 2022-07-12
Payer: MEDICARE

## 2022-07-12 VITALS
HEART RATE: 79 BPM | BODY MASS INDEX: 39 KG/M2 | DIASTOLIC BLOOD PRESSURE: 74 MMHG | HEIGHT: 63 IN | RESPIRATION RATE: 16 BRPM | SYSTOLIC BLOOD PRESSURE: 147 MMHG

## 2022-07-12 DIAGNOSIS — M15.9 PRIMARY OSTEOARTHRITIS INVOLVING MULTIPLE JOINTS: Primary | ICD-10-CM

## 2022-07-12 DIAGNOSIS — M65.341 ACQUIRED TRIGGER FINGER OF RIGHT RING FINGER: ICD-10-CM

## 2022-07-12 PROCEDURE — 3008F BODY MASS INDEX DOCD: CPT | Performed by: INTERNAL MEDICINE

## 2022-07-12 PROCEDURE — 99213 OFFICE O/P EST LOW 20 MIN: CPT | Performed by: INTERNAL MEDICINE

## 2022-07-12 PROCEDURE — 3078F DIAST BP <80 MM HG: CPT | Performed by: INTERNAL MEDICINE

## 2022-07-12 PROCEDURE — 3077F SYST BP >= 140 MM HG: CPT | Performed by: INTERNAL MEDICINE

## 2022-07-12 PROCEDURE — 1125F AMNT PAIN NOTED PAIN PRSNT: CPT | Performed by: INTERNAL MEDICINE

## 2022-07-12 PROCEDURE — 20550 NJX 1 TENDON SHEATH/LIGAMENT: CPT | Performed by: INTERNAL MEDICINE

## 2022-07-12 RX ORDER — METHYLPREDNISOLONE ACETATE 80 MG/ML
80 INJECTION, SUSPENSION INTRA-ARTICULAR; INTRALESIONAL; INTRAMUSCULAR; SOFT TISSUE ONCE
Status: COMPLETED | OUTPATIENT
Start: 2022-07-12 | End: 2022-07-12

## 2022-07-12 RX ADMIN — METHYLPREDNISOLONE ACETATE 80 MG: 80 INJECTION, SUSPENSION INTRA-ARTICULAR; INTRALESIONAL; INTRAMUSCULAR; SOFT TISSUE at 15:20:00

## 2022-07-12 NOTE — PROCEDURES
Joint Injection  Pre-procedure care:  Consent was obtained, Procedure/risks were explained, Questions were answered, Patient was prepped and draped in the usual sterile fashion, Correct side and site confirmed and Correct patient identified  Primary osteoarthritis involving multiple joints  (primary encounter diagnosis)  Acquired trigger finger of right ring finger    Injection Detail:  Procedure:  Site 1:  arthrocentesis small joint. Location:  Right hand MPC4 trigger finger. Method:  injection    Site Prepped:  Yes  Technique:  aseptic technique with povidone-iodine and alcohol    Anesthetic:  ethyl chloride    Milliliter(s):  n/a    Needle gauge:  25 gauge    Placement confirmed by:  manual palpation    Fluid:  N/A    Milliliter(s) fluid withdrawn:  n/a    Medication:  methylprednisolone acetate  Dose:  40mg    Sterile dressing:  Pressure - Yes  Adhesive - Yes    Patient response:  The patient did tolerate the procedure well. Improvement by site:  Site 1: 0% improvement    Plan:  Appropriate post injection instructions given.

## 2022-07-14 RX ORDER — IRBESARTAN 150 MG/1
150 TABLET ORAL NIGHTLY
Qty: 90 TABLET | Refills: 0 | Status: SHIPPED | OUTPATIENT
Start: 2022-07-14

## 2022-07-19 ENCOUNTER — OFFICE VISIT (OUTPATIENT)
Dept: PODIATRY CLINIC | Facility: CLINIC | Age: 67
End: 2022-07-19
Payer: MEDICARE

## 2022-07-19 DIAGNOSIS — M79.672 BILATERAL FOOT PAIN: ICD-10-CM

## 2022-07-19 DIAGNOSIS — M20.42 HAMMERTOES OF BOTH FEET: ICD-10-CM

## 2022-07-19 DIAGNOSIS — M21.621 TAILOR'S BUNION OF BOTH FEET: Primary | ICD-10-CM

## 2022-07-19 DIAGNOSIS — M21.622 TAILOR'S BUNION OF BOTH FEET: Primary | ICD-10-CM

## 2022-07-19 DIAGNOSIS — M20.41 HAMMERTOES OF BOTH FEET: ICD-10-CM

## 2022-07-19 DIAGNOSIS — L60.0 INGROWN TOENAIL OF BOTH FEET: ICD-10-CM

## 2022-07-19 DIAGNOSIS — M79.671 BILATERAL FOOT PAIN: ICD-10-CM

## 2022-07-19 DIAGNOSIS — L60.3 ONYCHODYSTROPHY: ICD-10-CM

## 2022-07-19 PROCEDURE — 99213 OFFICE O/P EST LOW 20 MIN: CPT | Performed by: PODIATRIST

## 2022-07-19 PROCEDURE — 1126F AMNT PAIN NOTED NONE PRSNT: CPT | Performed by: PODIATRIST

## 2022-07-20 RX ORDER — MONTELUKAST SODIUM 10 MG/1
TABLET ORAL
Qty: 90 TABLET | Refills: 0 | Status: SHIPPED | OUTPATIENT
Start: 2022-07-20

## 2022-07-20 RX ORDER — PANTOPRAZOLE SODIUM 40 MG/1
40 TABLET, DELAYED RELEASE ORAL
Qty: 60 TABLET | Refills: 2 | Status: SHIPPED | OUTPATIENT
Start: 2022-07-20 | End: 2022-08-19

## 2022-07-20 NOTE — TELEPHONE ENCOUNTER
Ngoc Wynn--    Patient urgently requesting refill for pantoprazole 40 mg twice daily. Please review and sign pended orders if appropriate. LOV: 06/15/2022- Advisement to increase to BID   LR: 05/09/2022 per pcp office   Future Appts: None     Thank you!

## 2022-10-08 ENCOUNTER — OFFICE VISIT (OUTPATIENT)
Dept: INTERNAL MEDICINE CLINIC | Facility: CLINIC | Age: 67
End: 2022-10-08
Payer: MEDICARE

## 2022-10-08 VITALS
DIASTOLIC BLOOD PRESSURE: 76 MMHG | BODY MASS INDEX: 38.98 KG/M2 | WEIGHT: 220 LBS | HEIGHT: 63 IN | HEART RATE: 73 BPM | SYSTOLIC BLOOD PRESSURE: 134 MMHG | OXYGEN SATURATION: 98 %

## 2022-10-08 DIAGNOSIS — R30.9 PAIN WITH URINATION: Primary | ICD-10-CM

## 2022-10-08 LAB
APPEARANCE: CLEAR
BILIRUBIN: NEGATIVE
GLUCOSE (URINE DIPSTICK): NEGATIVE MG/DL
KETONES (URINE DIPSTICK): NEGATIVE MG/DL
LEUKOCYTES: NEGATIVE
MULTISTIX LOT#: NORMAL NUMERIC
NITRITE, URINE: NEGATIVE
OCCULT BLOOD: NEGATIVE
PH, URINE: 6 (ref 4.5–8)
PROTEIN (URINE DIPSTICK): NEGATIVE MG/DL
SPECIFIC GRAVITY: 1.02 (ref 1–1.03)
URINE-COLOR: YELLOW
UROBILINOGEN,SEMI-QN: 0.2 MG/DL (ref 0–1.9)

## 2022-10-08 PROCEDURE — 1125F AMNT PAIN NOTED PAIN PRSNT: CPT | Performed by: INTERNAL MEDICINE

## 2022-10-08 PROCEDURE — 3078F DIAST BP <80 MM HG: CPT | Performed by: INTERNAL MEDICINE

## 2022-10-08 PROCEDURE — 99213 OFFICE O/P EST LOW 20 MIN: CPT | Performed by: INTERNAL MEDICINE

## 2022-10-08 PROCEDURE — 3075F SYST BP GE 130 - 139MM HG: CPT | Performed by: INTERNAL MEDICINE

## 2022-10-08 PROCEDURE — 3008F BODY MASS INDEX DOCD: CPT | Performed by: INTERNAL MEDICINE

## 2022-10-08 PROCEDURE — 81002 URINALYSIS NONAUTO W/O SCOPE: CPT | Performed by: INTERNAL MEDICINE

## 2022-10-18 ENCOUNTER — PATIENT MESSAGE (OUTPATIENT)
Dept: OTOLARYNGOLOGY | Facility: CLINIC | Age: 67
End: 2022-10-18

## 2022-10-18 RX ORDER — MONTELUKAST SODIUM 10 MG/1
TABLET ORAL
Qty: 90 TABLET | Refills: 0 | OUTPATIENT
Start: 2022-10-18

## 2022-10-18 NOTE — TELEPHONE ENCOUNTER
From: Anna Yanez  Sent: 10/18/2022 10:05 AM CDT  To: Em Ent Clinical Staff  Subject: Refill and appointment    I WILL be in need of a refill on Pantoprazole (sp?) soon. Thank you!

## 2022-11-04 RX ORDER — IRBESARTAN 150 MG/1
150 TABLET ORAL NIGHTLY
Qty: 90 TABLET | Refills: 1 | Status: SHIPPED | OUTPATIENT
Start: 2022-11-04

## 2022-11-04 NOTE — TELEPHONE ENCOUNTER
Protocol failed or has No Protocol, please review    Routing as Dr. Bret Cool  is out of office     Requested Prescriptions   Pending Prescriptions Disp Refills    Irbesartan 150 MG Oral Tab 90 tablet 0     Sig: Take 1 tablet (150 mg total) by mouth nightly. Hypertensive Medications Protocol Failed - 11/2/2022 10:11 AM        Failed - CMP or BMP in past 6 months     No results found for this or any previous visit (from the past 4392 hour(s)). Passed - In person appointment in the past 12 or next 3 months     Recent Outpatient Visits              3 weeks ago Pain with urination    Devendra Garza MD    Office Visit    3 months ago Tailor's bunion of both feet    [facility], [place], Vernadine Bolk, DPM    Office Visit    3 months ago Primary osteoarthritis involving multiple joints    [facility]  Bela Jauregui MD    Office Visit    4 months ago Primary osteoarthritis involving multiple joints    Bela Sapp MD    Office Visit    4 months ago Tailor's bunion of both [employer]. [address], [place], Vernadine Bolk, [place]    Office Visit                      Passed - Last BP reading less than 140/90     BP Readings from Last 1 Encounters:  10/08/22 : 134/76              Passed - In person appointment or virtual visit in the past 6 months     Recent Outpatient Visits              3 weeks ago Pain with urination    Devendra Garza MD    Office Visit    3 months ago Tailor's bunion of both feet    [facility]  [facility], [place], Vernadine Bolk, DPM    Office Visit    3 months ago Primary osteoarthritis involving multiple joints    [facility]  Bela Jauregui MD    Office Visit    4 months ago Primary osteoarthritis involving multiple joints    [facility], [address] [place] [address] Patel Schulte Shagufta Smith MD    Office Visit    4 months ago Tailor's bunion of both feet    [facility]. [address], [place], Sinda Breath, [place]    Office Visit                      Passed - EGFRCR or GFRNAA > 50     GFR Evaluation  GFRNAA: 91 , resulted on 11/4/2021               Recent Outpatient Visits              3 weeks ago Pain with urination    Roslyn Peterson MD    Office Visit    3 months ago Tailor's bunion of both feet    [facility]. [facility], [place], Sinda [place], DPM    Office Visit    3 months ago Primary osteoarthritis involving multiple joints    [facility], [address] [place] [address], Dena Hollis MD    Office Visit    4 months ago Primary osteoarthritis involving multiple joints    Dena Shelley MD    Office Visit    4 months ago Tailor's bunion of both [employer].  [address], [place], [place]inda [place], [place]    Office Visit

## 2022-12-21 RX ORDER — PANTOPRAZOLE SODIUM 40 MG/1
40 TABLET, DELAYED RELEASE ORAL
Qty: 90 TABLET | Refills: 0 | Status: SHIPPED | OUTPATIENT
Start: 2022-12-21 | End: 2023-03-21

## 2022-12-21 NOTE — TELEPHONE ENCOUNTER
Corrie is calling for a refill request for pt's pantoprazole script.     LOV was 6/15/2022 with Toni Sensor APN       pantoprazole 40 MG Oral Tab EC

## 2023-02-20 RX ORDER — PANTOPRAZOLE SODIUM 40 MG/1
TABLET, DELAYED RELEASE ORAL
Qty: 90 TABLET | Refills: 0 | Status: SHIPPED | OUTPATIENT
Start: 2023-02-20

## 2023-02-27 ENCOUNTER — APPOINTMENT (OUTPATIENT)
Dept: GENERAL RADIOLOGY | Age: 68
End: 2023-02-27
Attending: NURSE PRACTITIONER
Payer: MEDICARE

## 2023-02-27 ENCOUNTER — HOSPITAL ENCOUNTER (OUTPATIENT)
Age: 68
Discharge: HOME | End: 2023-02-27
Payer: MEDICARE

## 2023-02-27 VITALS
TEMPERATURE: 98 F | RESPIRATION RATE: 20 BRPM | DIASTOLIC BLOOD PRESSURE: 70 MMHG | OXYGEN SATURATION: 98 % | HEART RATE: 85 BPM | SYSTOLIC BLOOD PRESSURE: 148 MMHG

## 2023-02-27 DIAGNOSIS — R05.8 PRODUCTIVE COUGH: Primary | ICD-10-CM

## 2023-02-27 DIAGNOSIS — J98.01 BRONCHOSPASM: ICD-10-CM

## 2023-02-27 PROCEDURE — 99204 OFFICE O/P NEW MOD 45 MIN: CPT

## 2023-02-27 PROCEDURE — 99213 OFFICE O/P EST LOW 20 MIN: CPT

## 2023-02-27 PROCEDURE — 71046 X-RAY EXAM CHEST 2 VIEWS: CPT | Performed by: NURSE PRACTITIONER

## 2023-02-27 RX ORDER — AZITHROMYCIN 250 MG/1
TABLET, FILM COATED ORAL
Qty: 6 TABLET | Refills: 0 | Status: SHIPPED | OUTPATIENT
Start: 2023-02-27 | End: 2023-03-04

## 2023-02-27 RX ORDER — PREDNISONE 20 MG/1
40 TABLET ORAL DAILY
Qty: 10 TABLET | Refills: 0 | Status: SHIPPED | OUTPATIENT
Start: 2023-02-27 | End: 2023-03-04

## 2023-02-27 RX ORDER — ALBUTEROL SULFATE 90 UG/1
2 AEROSOL, METERED RESPIRATORY (INHALATION) EVERY 4 HOURS PRN
Qty: 1 EACH | Refills: 0 | Status: SHIPPED | OUTPATIENT
Start: 2023-02-27 | End: 2023-03-29

## 2023-02-27 NOTE — DISCHARGE INSTRUCTIONS
Steam shower, push fluids  Use inhaler for wheezing  For any new or worsening symptoms it is under our recommendation that you go to the emergency department

## 2023-03-22 NOTE — TELEPHONE ENCOUNTER
Spoke to patient, sent prescription for Macrobid to the pharmacy, she will report if she is not improving Nicotinamide Supplementation Recommendations: Take 500 mg of nicotinamide by mouth twice daily. Detail Level: Detailed Sunscreen Recommendations: Regularly apply at least an SPF 30 broad spectrum sunscreen while outdoors during the day. Detail Level: Zone Detail Level: Simple

## 2023-03-28 ENCOUNTER — OFFICE VISIT (OUTPATIENT)
Dept: INTERNAL MEDICINE CLINIC | Facility: CLINIC | Age: 68
End: 2023-03-28

## 2023-03-28 VITALS
WEIGHT: 220.38 LBS | RESPIRATION RATE: 18 BRPM | HEIGHT: 63 IN | HEART RATE: 80 BPM | BODY MASS INDEX: 39.05 KG/M2 | OXYGEN SATURATION: 95 % | DIASTOLIC BLOOD PRESSURE: 72 MMHG | SYSTOLIC BLOOD PRESSURE: 135 MMHG

## 2023-03-28 DIAGNOSIS — R10.11 RIGHT UPPER QUADRANT ABDOMINAL PAIN: ICD-10-CM

## 2023-03-28 DIAGNOSIS — Z12.31 BREAST CANCER SCREENING BY MAMMOGRAM: ICD-10-CM

## 2023-03-28 DIAGNOSIS — M79.18 MYOFASCIAL MUSCLE PAIN: ICD-10-CM

## 2023-03-28 DIAGNOSIS — R10.31 RIGHT LOWER QUADRANT ABDOMINAL PAIN: ICD-10-CM

## 2023-03-28 DIAGNOSIS — I10 PRIMARY HYPERTENSION: Primary | ICD-10-CM

## 2023-03-28 DIAGNOSIS — M19.90 ARTHRITIS: ICD-10-CM

## 2023-03-28 DIAGNOSIS — E66.09 CLASS 2 OBESITY DUE TO EXCESS CALORIES WITHOUT SERIOUS COMORBIDITY WITH BODY MASS INDEX (BMI) OF 39.0 TO 39.9 IN ADULT: ICD-10-CM

## 2023-03-28 DIAGNOSIS — E78.49 OTHER HYPERLIPIDEMIA: ICD-10-CM

## 2023-03-28 DIAGNOSIS — J98.01 COUGH DUE TO BRONCHOSPASM: ICD-10-CM

## 2023-03-28 PROBLEM — J41.0 SMOKERS' COUGH (HCC): Chronic | Status: ACTIVE | Noted: 2023-03-28

## 2023-03-28 RX ORDER — PREDNISONE 10 MG/1
TABLET ORAL
Qty: 12 TABLET | Refills: 0 | Status: SHIPPED | OUTPATIENT
Start: 2023-03-28

## 2023-03-28 RX ORDER — TOPIRAMATE 25 MG/1
25 TABLET ORAL 2 TIMES DAILY
Qty: 90 TABLET | Refills: 0 | Status: SHIPPED | OUTPATIENT
Start: 2023-03-28

## 2023-04-01 PROBLEM — S83.232A COMPLEX TEAR OF MEDIAL MENISCUS OF LEFT KNEE AS CURRENT INJURY: Status: RESOLVED | Noted: 2018-10-31 | Resolved: 2023-04-01

## 2023-04-01 PROBLEM — Z98.890 HISTORY OF LUMBAR SURGERY: Status: ACTIVE | Noted: 2023-04-01

## 2023-04-01 PROBLEM — M23.301 DEGENERATIVE TEAR OF LATERAL MENISCUS OF LEFT KNEE: Status: RESOLVED | Noted: 2019-01-17 | Resolved: 2023-04-01

## 2023-04-11 ENCOUNTER — TELEPHONE (OUTPATIENT)
Dept: INTERNAL MEDICINE CLINIC | Facility: CLINIC | Age: 68
End: 2023-04-11

## 2023-04-11 NOTE — TELEPHONE ENCOUNTER
Patient states she was in the office 3/28/23 and wanted to clarify that she was a smoker a long time ago in her early 20's and has not smoked for years. Patient states chart indicates she is a recent smoker and has smoker's cough. Patient states for insurance purposes, if she could have this updated in the chart. Patient has not smoked in over 40 plus years. Please advise.

## 2023-04-17 ENCOUNTER — LAB ENCOUNTER (OUTPATIENT)
Dept: LAB | Age: 68
End: 2023-04-17
Attending: INTERNAL MEDICINE
Payer: MEDICARE

## 2023-04-17 ENCOUNTER — HOSPITAL ENCOUNTER (OUTPATIENT)
Dept: CT IMAGING | Age: 68
Discharge: HOME | End: 2023-04-17
Attending: INTERNAL MEDICINE
Payer: MEDICARE

## 2023-04-17 DIAGNOSIS — M19.90 ARTHRITIS: ICD-10-CM

## 2023-04-17 DIAGNOSIS — E78.49 OTHER HYPERLIPIDEMIA: ICD-10-CM

## 2023-04-17 DIAGNOSIS — R10.31 RIGHT LOWER QUADRANT ABDOMINAL PAIN: ICD-10-CM

## 2023-04-17 DIAGNOSIS — R73.9 HYPERGLYCEMIA: ICD-10-CM

## 2023-04-17 DIAGNOSIS — I10 PRIMARY HYPERTENSION: ICD-10-CM

## 2023-04-17 DIAGNOSIS — R10.11 RIGHT UPPER QUADRANT ABDOMINAL PAIN: ICD-10-CM

## 2023-04-17 LAB
ALBUMIN SERPL-MCNC: 3.9 G/DL (ref 3.4–5)
ALBUMIN/GLOB SERPL: 1 {RATIO} (ref 1–2)
ALP LIVER SERPL-CCNC: 63 U/L
ALT SERPL-CCNC: 59 U/L
ANION GAP SERPL CALC-SCNC: 5 MMOL/L (ref 0–18)
AST SERPL-CCNC: 28 U/L (ref 15–37)
BASOPHILS # BLD AUTO: 0.06 X10(3) UL (ref 0–0.2)
BASOPHILS NFR BLD AUTO: 0.7 %
BILIRUB SERPL-MCNC: 0.6 MG/DL (ref 0.1–2)
BUN BLD-MCNC: 13 MG/DL (ref 7–18)
BUN/CREAT SERPL: 14 (ref 10–20)
CALCIUM BLD-MCNC: 9.2 MG/DL (ref 8.5–10.1)
CHLORIDE SERPL-SCNC: 104 MMOL/L (ref 98–112)
CHOLEST SERPL-MCNC: 317 MG/DL (ref ?–200)
CO2 SERPL-SCNC: 29 MMOL/L (ref 21–32)
CREAT BLD-MCNC: 0.9 MG/DL
CREAT BLD-MCNC: 0.93 MG/DL
CRP SERPL-MCNC: 0.56 MG/DL (ref ?–0.3)
DEPRECATED RDW RBC AUTO: 42.9 FL (ref 35.1–46.3)
EOSINOPHIL # BLD AUTO: 0.24 X10(3) UL (ref 0–0.7)
EOSINOPHIL NFR BLD AUTO: 2.9 %
ERYTHROCYTE [DISTWIDTH] IN BLOOD BY AUTOMATED COUNT: 13 % (ref 11–15)
ERYTHROCYTE [SEDIMENTATION RATE] IN BLOOD: 19 MM/HR
EST. AVERAGE GLUCOSE BLD GHB EST-MCNC: 131 MG/DL (ref 68–126)
FASTING PATIENT LIPID ANSWER: YES
FASTING STATUS PATIENT QL REPORTED: YES
GFR SERPLBLD BASED ON 1.73 SQ M-ARVRAT: 68 ML/MIN/1.73M2 (ref 60–?)
GFR SERPLBLD BASED ON 1.73 SQ M-ARVRAT: 71 ML/MIN/1.73M2 (ref 60–?)
GLOBULIN PLAS-MCNC: 3.9 G/DL (ref 2.8–4.4)
GLUCOSE BLD-MCNC: 140 MG/DL (ref 70–99)
HBA1C MFR BLD: 6.2 % (ref ?–5.7)
HCT VFR BLD AUTO: 46.6 %
HDLC SERPL-MCNC: 63 MG/DL (ref 40–59)
HGB BLD-MCNC: 15.1 G/DL
IMM GRANULOCYTES # BLD AUTO: 0.02 X10(3) UL (ref 0–1)
IMM GRANULOCYTES NFR BLD: 0.2 %
LDLC SERPL CALC-MCNC: 223 MG/DL (ref ?–100)
LYMPHOCYTES # BLD AUTO: 3.33 X10(3) UL (ref 1–4)
LYMPHOCYTES NFR BLD AUTO: 40.3 %
MCH RBC QN AUTO: 29.1 PG (ref 26–34)
MCHC RBC AUTO-ENTMCNC: 32.4 G/DL (ref 31–37)
MCV RBC AUTO: 89.8 FL
MONOCYTES # BLD AUTO: 0.62 X10(3) UL (ref 0.1–1)
MONOCYTES NFR BLD AUTO: 7.5 %
NEUTROPHILS # BLD AUTO: 3.99 X10 (3) UL (ref 1.5–7.7)
NEUTROPHILS # BLD AUTO: 3.99 X10(3) UL (ref 1.5–7.7)
NEUTROPHILS NFR BLD AUTO: 48.4 %
NONHDLC SERPL-MCNC: 254 MG/DL (ref ?–130)
OSMOLALITY SERPL CALC.SUM OF ELEC: 288 MOSM/KG (ref 275–295)
PLATELET # BLD AUTO: 232 10(3)UL (ref 150–450)
POTASSIUM SERPL-SCNC: 3.7 MMOL/L (ref 3.5–5.1)
PROT SERPL-MCNC: 7.8 G/DL (ref 6.4–8.2)
RBC # BLD AUTO: 5.19 X10(6)UL
RHEUMATOID FACT SERPL-ACNC: 43 IU/ML (ref ?–15)
SODIUM SERPL-SCNC: 138 MMOL/L (ref 136–145)
T4 FREE SERPL-MCNC: 1 NG/DL (ref 0.8–1.7)
TRIGL SERPL-MCNC: 165 MG/DL (ref 30–149)
TSI SER-ACNC: 4.68 MIU/ML (ref 0.36–3.74)
VLDLC SERPL CALC-MCNC: 37 MG/DL (ref 0–30)
WBC # BLD AUTO: 8.3 X10(3) UL (ref 4–11)

## 2023-04-17 PROCEDURE — 36415 COLL VENOUS BLD VENIPUNCTURE: CPT

## 2023-04-17 PROCEDURE — 84443 ASSAY THYROID STIM HORMONE: CPT

## 2023-04-17 PROCEDURE — 84439 ASSAY OF FREE THYROXINE: CPT

## 2023-04-17 PROCEDURE — 86431 RHEUMATOID FACTOR QUANT: CPT

## 2023-04-17 PROCEDURE — 85025 COMPLETE CBC W/AUTO DIFF WBC: CPT

## 2023-04-17 PROCEDURE — 83036 HEMOGLOBIN GLYCOSYLATED A1C: CPT

## 2023-04-17 PROCEDURE — 74177 CT ABD & PELVIS W/CONTRAST: CPT | Performed by: INTERNAL MEDICINE

## 2023-04-17 PROCEDURE — 80053 COMPREHEN METABOLIC PANEL: CPT

## 2023-04-17 PROCEDURE — 80061 LIPID PANEL: CPT

## 2023-04-17 PROCEDURE — 85652 RBC SED RATE AUTOMATED: CPT

## 2023-04-17 PROCEDURE — 82565 ASSAY OF CREATININE: CPT

## 2023-04-17 PROCEDURE — 86140 C-REACTIVE PROTEIN: CPT

## 2023-04-17 RX ORDER — PANTOPRAZOLE SODIUM 40 MG/1
40 TABLET, DELAYED RELEASE ORAL
Qty: 90 TABLET | Refills: 0 | Status: SHIPPED | OUTPATIENT
Start: 2023-04-17

## 2023-04-19 ENCOUNTER — TELEPHONE (OUTPATIENT)
Dept: INTERNAL MEDICINE CLINIC | Facility: CLINIC | Age: 68
End: 2023-04-19

## 2023-04-21 ENCOUNTER — HOSPITAL ENCOUNTER (OUTPATIENT)
Dept: CT IMAGING | Age: 68
Discharge: HOME | End: 2023-04-21
Attending: INTERNAL MEDICINE
Payer: MEDICARE

## 2023-04-21 DIAGNOSIS — R91.8 PULMONARY NODULES: ICD-10-CM

## 2023-04-21 PROCEDURE — 71260 CT THORAX DX C+: CPT | Performed by: INTERNAL MEDICINE

## 2023-04-23 ENCOUNTER — TELEPHONE (OUTPATIENT)
Dept: INTERNAL MEDICINE CLINIC | Facility: CLINIC | Age: 68
End: 2023-04-23

## 2023-04-25 NOTE — TELEPHONE ENCOUNTER
MD Danny Krause MD 18 hours ago (6:31 PM)       RN, please contact the patient and facilitate getting her added on my schedule next week.

## 2023-04-25 NOTE — TELEPHONE ENCOUNTER
Spoke with patient. Appointment scheduled for 5/4/23 at 12PM. Verified date, time, place, and where to park. Patient verbalized understanding.

## 2023-05-01 ENCOUNTER — HOSPITAL ENCOUNTER (OUTPATIENT)
Dept: NUCLEAR MEDICINE | Facility: HOSPITAL | Age: 68
Discharge: HOME | End: 2023-05-01
Attending: INTERNAL MEDICINE
Payer: MEDICARE

## 2023-05-01 DIAGNOSIS — R91.8 PULMONARY NODULES: ICD-10-CM

## 2023-05-01 LAB — GLUCOSE BLDC GLUCOMTR-MCNC: 112 MG/DL (ref 70–99)

## 2023-05-01 PROCEDURE — 82962 GLUCOSE BLOOD TEST: CPT

## 2023-05-01 PROCEDURE — 78815 PET IMAGE W/CT SKULL-THIGH: CPT | Performed by: INTERNAL MEDICINE

## 2023-05-03 ENCOUNTER — OFFICE VISIT (OUTPATIENT)
Dept: INTERNAL MEDICINE CLINIC | Facility: CLINIC | Age: 68
End: 2023-05-03

## 2023-05-03 VITALS
WEIGHT: 221 LBS | BODY MASS INDEX: 39.16 KG/M2 | HEART RATE: 81 BPM | RESPIRATION RATE: 18 BRPM | HEIGHT: 63 IN | SYSTOLIC BLOOD PRESSURE: 143 MMHG | DIASTOLIC BLOOD PRESSURE: 79 MMHG

## 2023-05-03 DIAGNOSIS — E78.49 OTHER HYPERLIPIDEMIA: ICD-10-CM

## 2023-05-03 DIAGNOSIS — E03.8 OTHER SPECIFIED HYPOTHYROIDISM: ICD-10-CM

## 2023-05-03 DIAGNOSIS — R73.03 PREDIABETES: ICD-10-CM

## 2023-05-03 DIAGNOSIS — R91.8 PULMONARY NODULES: Primary | ICD-10-CM

## 2023-05-03 PROCEDURE — 3008F BODY MASS INDEX DOCD: CPT | Performed by: INTERNAL MEDICINE

## 2023-05-03 PROCEDURE — 99213 OFFICE O/P EST LOW 20 MIN: CPT | Performed by: INTERNAL MEDICINE

## 2023-05-03 PROCEDURE — 3077F SYST BP >= 140 MM HG: CPT | Performed by: INTERNAL MEDICINE

## 2023-05-03 PROCEDURE — 1126F AMNT PAIN NOTED NONE PRSNT: CPT | Performed by: INTERNAL MEDICINE

## 2023-05-03 PROCEDURE — 3078F DIAST BP <80 MM HG: CPT | Performed by: INTERNAL MEDICINE

## 2023-05-04 ENCOUNTER — HOSPITAL ENCOUNTER (OUTPATIENT)
Dept: MAMMOGRAPHY | Age: 68
Discharge: HOME | End: 2023-05-04
Attending: INTERNAL MEDICINE
Payer: MEDICARE

## 2023-05-04 ENCOUNTER — OFFICE VISIT (OUTPATIENT)
Dept: PULMONOLOGY | Facility: CLINIC | Age: 68
End: 2023-05-04

## 2023-05-04 ENCOUNTER — LAB ENCOUNTER (OUTPATIENT)
Dept: LAB | Age: 68
End: 2023-05-04
Attending: INTERNAL MEDICINE
Payer: MEDICARE

## 2023-05-04 VITALS
RESPIRATION RATE: 14 BRPM | HEIGHT: 63 IN | HEART RATE: 83 BPM | WEIGHT: 221 LBS | BODY MASS INDEX: 39.16 KG/M2 | SYSTOLIC BLOOD PRESSURE: 143 MMHG | OXYGEN SATURATION: 96 % | DIASTOLIC BLOOD PRESSURE: 83 MMHG

## 2023-05-04 DIAGNOSIS — R91.8 PULMONARY NODULES: ICD-10-CM

## 2023-05-04 DIAGNOSIS — R91.8 PULMONARY NODULES: Primary | ICD-10-CM

## 2023-05-04 DIAGNOSIS — Z12.31 BREAST CANCER SCREENING BY MAMMOGRAM: ICD-10-CM

## 2023-05-04 PROCEDURE — 77067 SCR MAMMO BI INCL CAD: CPT | Performed by: INTERNAL MEDICINE

## 2023-05-04 PROCEDURE — 3008F BODY MASS INDEX DOCD: CPT | Performed by: INTERNAL MEDICINE

## 2023-05-04 PROCEDURE — 3079F DIAST BP 80-89 MM HG: CPT | Performed by: INTERNAL MEDICINE

## 2023-05-04 PROCEDURE — 1126F AMNT PAIN NOTED NONE PRSNT: CPT | Performed by: INTERNAL MEDICINE

## 2023-05-04 PROCEDURE — 1159F MED LIST DOCD IN RCRD: CPT | Performed by: INTERNAL MEDICINE

## 2023-05-04 PROCEDURE — 86635 COCCIDIOIDES ANTIBODY: CPT

## 2023-05-04 PROCEDURE — 86606 ASPERGILLUS ANTIBODY: CPT

## 2023-05-04 PROCEDURE — 86698 HISTOPLASMA ANTIBODY: CPT

## 2023-05-04 PROCEDURE — 86612 BLASTOMYCES ANTIBODY: CPT

## 2023-05-04 PROCEDURE — 77063 BREAST TOMOSYNTHESIS BI: CPT | Performed by: INTERNAL MEDICINE

## 2023-05-04 PROCEDURE — 3077F SYST BP >= 140 MM HG: CPT | Performed by: INTERNAL MEDICINE

## 2023-05-04 PROCEDURE — 36415 COLL VENOUS BLD VENIPUNCTURE: CPT

## 2023-05-04 PROCEDURE — 99203 OFFICE O/P NEW LOW 30 MIN: CPT | Performed by: INTERNAL MEDICINE

## 2023-05-04 PROCEDURE — 86641 CRYPTOCOCCUS ANTIBODY: CPT

## 2023-05-07 PROBLEM — E03.9 ACQUIRED HYPOTHYROIDISM: Status: ACTIVE | Noted: 2023-05-07

## 2023-05-09 LAB
ASPER FLAVUS: NEGATIVE
ASPER FUMIGATUS: NEGATIVE
ASPER NIGER: NEGATIVE
BLASTOMYCES ABS QN DID: NEGATIVE

## 2023-05-17 RX ORDER — TOPIRAMATE 25 MG/1
TABLET ORAL
Qty: 90 TABLET | Refills: 3 | OUTPATIENT
Start: 2023-05-17

## 2023-07-21 RX ORDER — PANTOPRAZOLE SODIUM 40 MG/1
40 TABLET, DELAYED RELEASE ORAL
Qty: 90 TABLET | Refills: 3 | Status: SHIPPED | OUTPATIENT
Start: 2023-07-21

## 2023-07-21 NOTE — TELEPHONE ENCOUNTER
Pharmacy requesting refill     pantoprazole 40 MG Oral Tab EC Take 1 tablet (40 mg total) by mouth before breakfast. 90 tablet 0

## 2023-07-21 NOTE — TELEPHONE ENCOUNTER
Refill passed per [place] [employer], [facility] protocol.   Requested Prescriptions   Pending Prescriptions Disp Refills    pantoprazole 40 MG Oral Tab EC 90 tablet 1     Sig: Take 1 tablet (40 mg total) by mouth before breakfast.       Gastrointestional Medication Protocol Passed - 7/21/2023  9:59 AM        Passed - In person appointment or virtual visit in the past 12 mos or appointment in next 3 mos     Recent Outpatient Visits              2 months ago Pulmonary nodules    Kei Delgado MD    Office Visit    2 months ago Pulmonary nodules    [address],[facility], [facility], Hector Muñoz MD    Office Visit    3 months ago Primary hypertension    [address], [facility], Hector Muñoz MD    Office Visit    9 months ago Pain with urination    [address],[facility], [facility], Natalya Patient, Teressa Hernandez MD    Office Visit    1 year ago Tailor's bunion of both feet    [facility], [place], [place]    Office Visit          Future Appointments         Provider Department Appt Notes    In 1 month [address] Lung nodes

## 2023-08-21 ENCOUNTER — LAB ENCOUNTER (OUTPATIENT)
Dept: LAB | Age: 68
End: 2023-08-21
Attending: INTERNAL MEDICINE
Payer: MEDICARE

## 2023-08-21 ENCOUNTER — HOSPITAL ENCOUNTER (OUTPATIENT)
Dept: CT IMAGING | Age: 68
Discharge: HOME | End: 2023-08-21
Attending: INTERNAL MEDICINE
Payer: MEDICARE

## 2023-08-21 DIAGNOSIS — R73.03 PREDIABETES: ICD-10-CM

## 2023-08-21 DIAGNOSIS — E78.49 OTHER HYPERLIPIDEMIA: ICD-10-CM

## 2023-08-21 DIAGNOSIS — E03.8 OTHER SPECIFIED HYPOTHYROIDISM: ICD-10-CM

## 2023-08-21 DIAGNOSIS — R91.8 PULMONARY NODULES: ICD-10-CM

## 2023-08-21 LAB
ALBUMIN SERPL-MCNC: 3.8 G/DL (ref 3.4–5)
ALBUMIN/GLOB SERPL: 1.1 {RATIO} (ref 1–2)
ALP LIVER SERPL-CCNC: 71 U/L
ALT SERPL-CCNC: 44 U/L
ANION GAP SERPL CALC-SCNC: 9 MMOL/L (ref 0–18)
AST SERPL-CCNC: 21 U/L (ref 15–37)
BASOPHILS # BLD AUTO: 0.04 X10(3) UL (ref 0–0.2)
BASOPHILS NFR BLD AUTO: 0.6 %
BILIRUB SERPL-MCNC: 0.6 MG/DL (ref 0.1–2)
BUN BLD-MCNC: 13 MG/DL (ref 7–18)
BUN/CREAT SERPL: 15.1 (ref 10–20)
CALCIUM BLD-MCNC: 9.4 MG/DL (ref 8.5–10.1)
CHLORIDE SERPL-SCNC: 101 MMOL/L (ref 98–112)
CHOLEST SERPL-MCNC: 281 MG/DL (ref ?–200)
CO2 SERPL-SCNC: 28 MMOL/L (ref 21–32)
CREAT BLD-MCNC: 0.86 MG/DL
DEPRECATED RDW RBC AUTO: 40.2 FL (ref 35.1–46.3)
EGFRCR SERPLBLD CKD-EPI 2021: 74 ML/MIN/1.73M2 (ref 60–?)
EOSINOPHIL # BLD AUTO: 0.22 X10(3) UL (ref 0–0.7)
EOSINOPHIL NFR BLD AUTO: 3.1 %
ERYTHROCYTE [DISTWIDTH] IN BLOOD BY AUTOMATED COUNT: 12.3 % (ref 11–15)
EST. AVERAGE GLUCOSE BLD GHB EST-MCNC: 131 MG/DL (ref 68–126)
FASTING PATIENT LIPID ANSWER: YES
FASTING STATUS PATIENT QL REPORTED: YES
GLOBULIN PLAS-MCNC: 3.5 G/DL (ref 2.8–4.4)
GLUCOSE BLD-MCNC: 131 MG/DL (ref 70–99)
HBA1C MFR BLD: 6.2 % (ref ?–5.7)
HCT VFR BLD AUTO: 45.1 %
HDLC SERPL-MCNC: 57 MG/DL (ref 40–59)
HGB BLD-MCNC: 14.7 G/DL
IMM GRANULOCYTES # BLD AUTO: 0.02 X10(3) UL (ref 0–1)
IMM GRANULOCYTES NFR BLD: 0.3 %
LDLC SERPL CALC-MCNC: 194 MG/DL (ref ?–100)
LYMPHOCYTES # BLD AUTO: 2.14 X10(3) UL (ref 1–4)
LYMPHOCYTES NFR BLD AUTO: 30.4 %
MCH RBC QN AUTO: 29.2 PG (ref 26–34)
MCHC RBC AUTO-ENTMCNC: 32.6 G/DL (ref 31–37)
MCV RBC AUTO: 89.5 FL
MONOCYTES # BLD AUTO: 0.65 X10(3) UL (ref 0.1–1)
MONOCYTES NFR BLD AUTO: 9.2 %
NEUTROPHILS # BLD AUTO: 3.97 X10 (3) UL (ref 1.5–7.7)
NEUTROPHILS # BLD AUTO: 3.97 X10(3) UL (ref 1.5–7.7)
NEUTROPHILS NFR BLD AUTO: 56.4 %
NONHDLC SERPL-MCNC: 224 MG/DL (ref ?–130)
OSMOLALITY SERPL CALC.SUM OF ELEC: 288 MOSM/KG (ref 275–295)
PLATELET # BLD AUTO: 221 10(3)UL (ref 150–450)
POTASSIUM SERPL-SCNC: 4.1 MMOL/L (ref 3.5–5.1)
PROT SERPL-MCNC: 7.3 G/DL (ref 6.4–8.2)
RBC # BLD AUTO: 5.04 X10(6)UL
SODIUM SERPL-SCNC: 138 MMOL/L (ref 136–145)
T4 FREE SERPL-MCNC: 0.9 NG/DL (ref 0.8–1.7)
TRIGL SERPL-MCNC: 161 MG/DL (ref 30–149)
TSI SER-ACNC: 3.96 MIU/ML (ref 0.36–3.74)
VLDLC SERPL CALC-MCNC: 34 MG/DL (ref 0–30)
WBC # BLD AUTO: 7 X10(3) UL (ref 4–11)

## 2023-08-21 PROCEDURE — 85025 COMPLETE CBC W/AUTO DIFF WBC: CPT

## 2023-08-21 PROCEDURE — 71260 CT THORAX DX C+: CPT | Performed by: INTERNAL MEDICINE

## 2023-08-21 PROCEDURE — 80053 COMPREHEN METABOLIC PANEL: CPT

## 2023-08-21 PROCEDURE — 84439 ASSAY OF FREE THYROXINE: CPT

## 2023-08-21 PROCEDURE — 82565 ASSAY OF CREATININE: CPT

## 2023-08-21 PROCEDURE — 36415 COLL VENOUS BLD VENIPUNCTURE: CPT

## 2023-08-21 PROCEDURE — 80061 LIPID PANEL: CPT

## 2023-08-21 PROCEDURE — 84443 ASSAY THYROID STIM HORMONE: CPT

## 2023-08-21 PROCEDURE — 83036 HEMOGLOBIN GLYCOSYLATED A1C: CPT

## 2023-08-23 DIAGNOSIS — R91.1 PULMONARY NODULE: Primary | ICD-10-CM

## 2023-08-24 LAB
CREAT BLD-MCNC: 1 MG/DL
EGFRCR SERPLBLD CKD-EPI 2021: 62 ML/MIN/1.73M2 (ref 60–?)

## 2023-10-15 RX ORDER — IRBESARTAN 150 MG/1
150 TABLET ORAL NIGHTLY
Qty: 90 TABLET | Refills: 1 | Status: SHIPPED | OUTPATIENT
Start: 2023-10-15

## 2023-10-15 NOTE — TELEPHONE ENCOUNTER
Please review. Protocol failed / No Protocol.     Requested Prescriptions   Pending Prescriptions Disp Refills    IRBESARTAN 150 MG Oral Tab [Pharmacy Med Name: IRBESARTAN 150MG TABLETS] 90 tablet 1     Sig: TAKE 1 TABLET(150 MG) BY MOUTH EVERY NIGHT       Hypertensive Medications Protocol Failed - 10/13/2023  6:08 AM        Failed - Last BP reading less than 140/90     BP Readings from Last 1 Encounters:  05/04/23 : 143/83              Passed - In person appointment in the past 12 or next 3 months     Recent Outpatient Visits              5 months ago Pulmonary nodules    Lyndsey Gale, [address] [place] [address], Bhavna Doan MD    Office Visit    5 months ago [facility] nodules    [facility]    Office Visit    6 months ago Primary hypertension    [facility], Babatunde Ledesma MD    Office Visit    1 year ago Pain with urination    [facility], Dheeraj Vega, Robin Clark MD    Office Visit    1 year ago Tailor's bunion of both feet    [facility] [address], [place], Eller Nissen, [place]    Office Visit                      Passed - CMP or BMP in past 6 months     Recent Results (from the past 4392 hour(s))   Comp Metabolic Panel (14)    Collection Time: 08/21/23 12:19 PM   Result Value Ref Range    Glucose 131 (H) 70 - 99 mg/dL    Sodium 138 136 - 145 mmol/L    Potassium 4.1 3.5 - 5.1 mmol/L    Chloride 101 98 - 112 mmol/L    CO2 28.0 21.0 - 32.0 mmol/L    Anion Gap 9 0 - 18 mmol/L    BUN 13 7 - 18 mg/dL    Creatinine 0.86 0.55 - 1.02 mg/dL    BUN/CREA Ratio 15.1 10.0 - 20.0    Calcium, Total 9.4 8.5 - 10.1 mg/dL    Calculated Osmolality 288 275 - 295 mOsm/kg    eGFR-Cr 74 >=60 mL/min/1.73m2    ALT 44 13 - 56 U/L    AST 21 15 - 37 U/L    Alkaline Phosphatase 71 55 - 142 U/L    Bilirubin, Total 0.6 0.1 - 2.0 mg/dL    Total Protein 7.3 6.4 - 8.2 g/dL Albumin 3.8 3.4 - 5.0 g/dL    Globulin  3.5 2.8 - 4.4 g/dL    A/G Ratio 1.1 1.0 - 2.0    Patient Fasting for CMP? Yes      *Note: Due to a large number of results and/or encounters for the requested time period, some results have not been displayed. A complete set of results can be found in Results Review.                Passed - In person appointment or virtual visit in the past 6 months     Recent Outpatient Visits              5 months ago Pulmonary nodules    Niecy Diaz MD    Office Visit    5 months ago Pulmonary nodules    [address],[facility], [facility], [place] Malcolm Plough, MD    Office Visit    6 months ago Primary hypertension    [address],[facility], [facility], Andrea Jerez MD    Office Visit    1 year ago Pain with urination    [address],[facility], [facility], Vy Pham MD    Office Visit    1 year ago Tailor's bunion of both feet    [employer], [address], [place], [place], [place]    Office Visit                      Passed - [facility] or GFRNAA > 50     GFR Evaluation  EGFRCR: 62 , resulted on 8/21/2023

## 2024-03-05 ENCOUNTER — HOSPITAL ENCOUNTER (OUTPATIENT)
Dept: CT IMAGING | Age: 69
Discharge: HOME | End: 2024-03-05
Attending: INTERNAL MEDICINE
Payer: MEDICARE

## 2024-03-05 DIAGNOSIS — R91.1 PULMONARY NODULE: ICD-10-CM

## 2024-03-05 LAB
CREAT BLD-MCNC: 0.9 MG/DL
EGFRCR SERPLBLD CKD-EPI 2021: 70 ML/MIN/1.73M2 (ref 60–?)

## 2024-03-05 PROCEDURE — 71260 CT THORAX DX C+: CPT | Performed by: INTERNAL MEDICINE

## 2024-03-05 PROCEDURE — 82565 ASSAY OF CREATININE: CPT

## 2024-04-12 ENCOUNTER — TELEPHONE (OUTPATIENT)
Dept: INTERNAL MEDICINE CLINIC | Facility: CLINIC | Age: 69
End: 2024-04-12

## 2024-04-12 RX ORDER — IRBESARTAN 150 MG/1
150 TABLET ORAL NIGHTLY
Qty: 30 TABLET | Refills: 0 | Status: SHIPPED | OUTPATIENT
Start: 2024-04-12

## 2024-04-12 NOTE — TELEPHONE ENCOUNTER
Please review; protocol failed/ has no protocol    Message sent for patient to make an appointment.     Requested Prescriptions   Pending Prescriptions Disp Refills    IRBESARTAN 150 MG Oral Tab [Pharmacy Med Name: IRBESARTAN 150MG TABLETS] 90 tablet 1     Sig: TAKE 1 TABLET(150 MG) BY MOUTH EVERY NIGHT       Hypertension Medications Protocol Failed - 4/11/2024  4:04 PM        Failed - Last BP reading less than 140/90     BP Readings from Last 1 Encounters:   05/04/23 143/83               Passed - CMP or BMP in past 12 months        Passed - In person appointment or virtual visit in the past 12 mos or appointment in next 3 mos     Recent Outpatient Visits              11 months ago Pulmonary nodules    [facility] Dre Smith MD    Office Visit    11 months ago [facility]    Office Visit    1 year ago Primary hypertension    [facility], [address], [place] Lorna Baum MD    Office Visit    1 year ago Pain with urination    [facility], [address], [place] Marie Flaherty MD    Office Visit    1 year ago Tailor's bunion of both feet    [facility], [address], [place] Leighton Marroquin DPM    Office Visit          Future Appointments         Provider Department Appt Notes    In 10 months [facility] [address] [place] [address] CT - [place] Per Dr Smith orders- lung nodules, thyroid                    Passed - EGFRCR or GFRNAA > 50     GFR Evaluation  EGFRCR: 70 , resulted on 3/5/2024             Recent Outpatient Visits              11 months ago Pulmonary nodules    [facility] Dre Paz MD    Office Visit    11 months ago Pulmonary nodules    [facility]    Office Visit    1 year ago Primary hypertension    [facility]  [facility], [address], [place] Lorna Baum MD    Office Visit    1 year ago Pain with urination    [facility], [address], [place] Marie Flaherty MD    Office Visit    1 year ago Tailor's bunion of both feet    [facility], [address], [place] Leighton Marroquin DPM    Office Visit          Future Appointments         Provider Department Appt Notes    In 10 months [facility] CT - [place] Per Dr Smith orders- lung nodules, thyroid

## 2024-04-12 NOTE — TELEPHONE ENCOUNTER
Talked to patient and she states that she's out of [place] right not and she is going to make an appointment thru her MyChart.

## 2024-04-13 ENCOUNTER — HOSPITAL ENCOUNTER (OUTPATIENT)
Age: 69
Discharge: HOME | End: 2024-04-13
Payer: MEDICARE

## 2024-04-13 VITALS
DIASTOLIC BLOOD PRESSURE: 60 MMHG | HEART RATE: 78 BPM | RESPIRATION RATE: 20 BRPM | OXYGEN SATURATION: 95 % | TEMPERATURE: 97 F | SYSTOLIC BLOOD PRESSURE: 153 MMHG

## 2024-04-13 DIAGNOSIS — R39.15 URINARY URGENCY: Primary | ICD-10-CM

## 2024-04-13 LAB
BILIRUB UR QL STRIP: NEGATIVE
CLARITY UR: CLEAR
COLOR UR: YELLOW
GLUCOSE UR STRIP-MCNC: NEGATIVE MG/DL
HGB UR QL STRIP: NEGATIVE
KETONES UR STRIP-MCNC: NEGATIVE MG/DL
LEUKOCYTE ESTERASE UR QL STRIP: NEGATIVE
NITRITE UR QL STRIP: NEGATIVE
PH UR STRIP: 6 [PH]
PROT UR STRIP-MCNC: NEGATIVE MG/DL
SP GR UR STRIP: >=1.03
UROBILINOGEN UR STRIP-ACNC: <2 MG/DL

## 2024-04-13 PROCEDURE — 87086 URINE CULTURE/COLONY COUNT: CPT | Performed by: PHYSICIAN ASSISTANT

## 2024-04-13 PROCEDURE — 99214 OFFICE O/P EST MOD 30 MIN: CPT

## 2024-04-13 PROCEDURE — 81002 URINALYSIS NONAUTO W/O SCOPE: CPT

## 2024-04-13 PROCEDURE — 87077 CULTURE AEROBIC IDENTIFY: CPT | Performed by: PHYSICIAN ASSISTANT

## 2024-04-13 PROCEDURE — 87186 SC STD MICRODIL/AGAR DIL: CPT | Performed by: PHYSICIAN ASSISTANT

## 2024-04-13 RX ORDER — NITROFURANTOIN 25; 75 MG/1; MG/1
100 CAPSULE ORAL 2 TIMES DAILY
Qty: 14 CAPSULE | Refills: 0 | Status: SHIPPED | OUTPATIENT
Start: 2024-04-13 | End: 2024-04-20

## 2024-04-13 NOTE — DISCHARGE INSTRUCTIONS
READDRESS CULTURE RESULTS IN NEXT 2-3 DAYS. GO TO ER FOR BREAKTHROUGH CHANGES/CONCERNS as ADVISED.

## 2024-04-13 NOTE — TELEPHONE ENCOUNTER
Please call patient I refilled medication for 90 days she should makeshe is due for follow-up visit appointment for Medicare annual physical

## 2024-04-13 NOTE — ED PROVIDER NOTES
Chief Complaint   Patient presents with    Urinary Symptoms       HPI:     Shayna Perez is a 68 year old female who presents for evaluation of intermittent lower pelvic cramping over the last few days.  Notes mildly increased urgency with urination without associated dysuria or hematuria.  Notes previous history of bladder infections most recently within the last couple years with similar initial symptoms.  Denies any complicated bladder infections including kidney infections or kidney stones.  Denies associated fever headache dizziness cough congestion sore throat neck pain chest pain shortness of breath abdominal pain vomiting diarrhea vaginal bleeding or discharge.  Patient notes to an Azo strip test positive for UTI at home this morning.      PFSH    PFSH asessment screens reviewed and agree.  Nurses notes reviewed I agree with documentation.    Family History   Problem Relation Age of Onset    Diabetes Father 74    Heart Disease Brother 57    Diabetes Brother     Colon Cancer Paternal Grandmother      Family history reviewed with patient/caregiver and is not pertinent to presenting problem.  Social History     Socioeconomic History    Marital status:      Spouse name: Not on file    Number of children: Not on file    Years of education: Not on file    Highest education level: Not on file   Occupational History    Not on file   Tobacco Use    Smoking status: Former     Current packs/day: 0.25     Average packs/day: 0.3 packs/day for 10.0 years (2.5 ttl pk-yrs)     Types: Cigarettes    Smokeless tobacco: Never   Vaping Use    Vaping status: Never Used   Substance and Sexual Activity    Alcohol use: Yes     Alcohol/week: 1.0 standard drink of alcohol     Types: 1 Glasses of wine per week     Comment: occassional    Drug use: No    Sexual activity: Not on file   Other Topics Concern     Service Not Asked    Blood Transfusions Not Asked    Caffeine Concern No    Occupational Exposure Not Asked     Hobby Hazards Not Asked    Sleep Concern Not Asked    Stress Concern Not Asked    Weight Concern Not Asked    Special Diet Not Asked    Back Care Not Asked    Exercise Not Asked    Bike Helmet Not Asked    Seat Belt Not Asked    Self-Exams Not Asked   Social History Narrative    Not on file     Social Determinants of Health     Financial Resource Strain: Not on file   Food Insecurity: Not on file   Transportation Needs: Not on file   Physical Activity: Not on file   Stress: Not on file   Social Connections: Not on file   Housing Stability: Not on file         ROS:   Positive for stated complaint: Abdominal cramping urgency.  All other systems reviewed and negative except as noted above.  Constitutional and Vital Signs Reviewed.      Physical Exam:     Findings:    /60   Pulse 78   Temp 96.7 °F (35.9 °C) (Temporal)   Resp 20   SpO2 95%   GENERAL: well developed, well nourished, well hydrated, no distress  SKIN: good skin turgor, no obvious rashes  EXTREMITIES: no cyanosis or edema. GONZALEZ without difficulty  GI: No adnexal or CVA tenderness, negative Alvarado.  Negative MCBurney.  No rebound.  Soft, non-tender, normal bowel sounds  HEAD: normocephalic, atraumatic  EYES: sclera non icteric bilateral, conjunctiva clear  NEURO: No focal deficits  PSYCH: Alert and oriented x3.  Answering questions appropriately.  Mood appropriate.    MDM/Assessment/Plan:   Orders for this encounter:    Orders Placed This Encounter    POCT Urinalysis Dipstick    Urine Culture, Routine     Order Specific Question:   Specimen source:     Answer:   Urine, clean catch     Order Specific Question:   Documentation of symptoms of UTI or sepsis:     Answer:   Documentation of symptoms of UTI or sepsis must be corroborated within the EMR     Order Specific Question:   Release to patient     Answer:   Immediate    POCT Urine Dip    nitrofurantoin monohydrate macro 100 MG Oral Cap     Sig: Take 1 capsule (100 mg total) by mouth 2 (two) times  daily for 7 days.     Dispense:  14 capsule     Refill:  0       Labs performed this visit:  Recent Results (from the past 10 hour(s))   POCT Urinalysis Dipstick    Collection Time: 04/13/24 10:54 AM   Result Value Ref Range    Urine Color Yellow Yellow    Urine Clarity Clear Clear    Specific Gravity, Urine >=1.030 1.005 - 1.030    PH, Urine 6.0 5.0 - 8.0    Protein urine Negative Negative mg/dL    Glucose, Urine Negative Negative mg/dL    Ketone, Urine Negative Negative mg/dL    Bilirubin, Urine Negative Negative    Blood, Urine Negative Negative    Nitrite Urine Negative Negative    Urobilinogen urine <2.0 <2.0 mg/dL    Leukocyte esterase urine Negative Negative       MDM:  Urinalysis without properties of concern, culture sent based on patient symptoms with low clinical suspicion for UTI.  Patient requesting empiric coverage based on patient previous cultures without growth most recently 2022 and no renal impairment will provide Macrobid and instructed to reevaluate based on culture results in the days ahead versus go to the ER for any breakthrough changes, happy with plan of care, alert nontoxic    Diagnosis:    ICD-10-CM    1. Urinary urgency  R39.15           All results reviewed and discussed with patient.  See AVS for detailed discharge instructions for your condition today.    Follow Up with:  Lorna Baum MD  [address] [place] [address] [phone]    Schedule an appointment as soon as possible for a visit   follow up to readdress symptoms and culture as instructed.

## 2024-04-15 RX ORDER — IRBESARTAN 150 MG/1
150 TABLET ORAL NIGHTLY
Qty: 90 TABLET | Refills: 3 | OUTPATIENT
Start: 2024-04-15

## 2024-04-15 NOTE — TELEPHONE ENCOUNTER
Please review. Protocol Failed; No Protocol  Future Appointments   Date Time Provider Department Center   5/24/2024  1:20 PM Lorna Baum MD [id number] [place]   3/5/2025  1:00 PM [facility] CT RM1 [facility] CT EM [place]        Requested Prescriptions   Pending Prescriptions Disp Refills    IRBESARTAN 150 MG Oral Tab [Pharmacy Med Name: IRBESARTAN 150MG TABLETS] 90 tablet 0     Sig: TAKE 1 TABLET(150 MG) BY MOUTH EVERY NIGHT       Hypertension Medications Protocol Failed - 4/13/2024  8:57 AM        Failed - Last BP reading less than 140/90     BP Readings from Last 1 Encounters:   04/13/24 153/60               Passed - CMP or BMP in past 12 months        Passed - In person appointment or virtual visit in the past 12 mos or appointment in next 3 mos     Recent Outpatient Visits              11 months ago Pulmonary nodules    [facility] Dre Smith MD    Office Visit    11 months ago Pulmonary nodules    [facility], [address], [place] Lorna Baum MD    Office Visit    1 year ago Primary hypertension    [facility], [address], [place] Lorna Baum MD    Office Visit    1 year ago Pain with urination    [facility], [address], [place] Marie Flaherty MD    Office Visit    1 year ago Tailor's bunion of both feet    [facility], [address], [place] Leighton Marroquin DPM    Office Visit          Future Appointments         Provider Department Appt Notes    In 1 month Lorna Baum MD [facility], [address], [place] Annual physical, assisted weight loss - need help!, incontinence, chronic cough update    In 10 months [facility] CT [facility] CT - [place] Per Dr Smith orders- lung nodules, thyroid                    Passed - EGFRCR or GFRNAA > 50     GFR Evaluation  EGFRCR: 70 , resulted on 3/5/2024                 Future Appointments         Provider Department Appt  Notes    In 1 month Lorna Baum MD [facility], [address], [place] Annual physical, assisted weight loss - need help!, incontinence, chronic cough update    In 10 months [facility] CT RM1 [facility] CT - [place] Per Dr Smith orders- lung nodules, thyroid          Recent Outpatient Visits              11 months ago Pulmonary nodules    [facility] Dre Smith MD    Office Visit    11 months ago Pulmonary nodules    [facility], [address], [place] Lonra Baum MD    Office Visit    1 year ago Primary hypertension    [facility], [address], [place] Lorna Baum MD    Office Visit    1 year ago Pain with urination    [facility], [address], [place] Marie Flaherty MD    Office Visit    1 year ago Tailor's bunion of both feet    [facility], [address], [place] Leighton Marroquin DPM    Office Visit

## 2024-05-22 ENCOUNTER — OFFICE VISIT (OUTPATIENT)
Dept: INTERNAL MEDICINE CLINIC | Facility: CLINIC | Age: 69
End: 2024-05-22

## 2024-05-22 ENCOUNTER — NURSE TRIAGE (OUTPATIENT)
Dept: INTERNAL MEDICINE CLINIC | Facility: CLINIC | Age: 69
End: 2024-05-22

## 2024-05-22 VITALS
WEIGHT: 216.63 LBS | TEMPERATURE: 97 F | RESPIRATION RATE: 18 BRPM | OXYGEN SATURATION: 94 % | HEIGHT: 63 IN | DIASTOLIC BLOOD PRESSURE: 84 MMHG | HEART RATE: 77 BPM | SYSTOLIC BLOOD PRESSURE: 152 MMHG | BODY MASS INDEX: 38.38 KG/M2

## 2024-05-22 DIAGNOSIS — J20.9 ACUTE BRONCHITIS, UNSPECIFIED ORGANISM: Primary | ICD-10-CM

## 2024-05-22 PROCEDURE — 99213 OFFICE O/P EST LOW 20 MIN: CPT | Performed by: INTERNAL MEDICINE

## 2024-05-22 RX ORDER — ALBUTEROL SULFATE 90 UG/1
2 AEROSOL, METERED RESPIRATORY (INHALATION) EVERY 6 HOURS PRN
Qty: 1 EACH | Refills: 0 | Status: SHIPPED | OUTPATIENT
Start: 2024-05-22

## 2024-05-22 RX ORDER — BENZONATATE 200 MG/1
200 CAPSULE ORAL 3 TIMES DAILY PRN
Qty: 40 CAPSULE | Refills: 1 | Status: SHIPPED | OUTPATIENT
Start: 2024-05-22

## 2024-05-22 RX ORDER — PREDNISONE 10 MG/1
TABLET ORAL
Qty: 30 TABLET | Refills: 0 | Status: SHIPPED | OUTPATIENT
Start: 2024-05-22

## 2024-05-22 NOTE — PROGRESS NOTES
Shayna Perez is a 68 year old female.  Chief Complaint   Patient presents with    Cough     HPI:   Patient presented today with c/o cough. She states that she just returned from [place] and her  had a cough. Since than she has been having cough, mostly dry, heard herself wheeze also. Some shortness of breath with activity. No fever, no runny nose. No other complains.       Current Outpatient Medications   Medication Sig Dispense Refill    benzonatate 200 MG Oral Cap Take 1 capsule (200 mg total) by mouth 3 (three) times daily as needed for cough. 40 capsule 1    albuterol 108 (90 Base) MCG/ACT Inhalation Aero Soln Inhale 2 puffs into the lungs every 6 (six) hours as needed for Wheezing (cough). 1 each 0    predniSONE 10 MG Oral Tab 4 tabs daily for 3 days, then 3 tabs daily for 3 days, then 2 tabs daily for 3 days, 1 tab daily for 3 days. 30 tablet 0    Irbesartan 150 MG Oral Tab Take 1 tablet (150 mg total) by mouth nightly. 30 tablet 0    pantoprazole 40 MG Oral Tab EC Take 1 tablet (40 mg total) by mouth before breakfast. 90 tablet 3    Omega-3 Fatty Acids (OMEGA-3 FISH OIL) 1000 MG Oral Cap Take by mouth As Directed.      Multiple Vitamins-Minerals (ZINC OR) Take 1 tablet by mouth daily.      Cholecalciferol (D3 OR) Take 1 tablet by mouth daily.      IRON OR Take 1 tablet by mouth daily.      Ascorbic Acid (VITAMIN C OR) Take 2 tablets by mouth daily.      Melatonin 5 MG Oral Tab Take 2 tablets (10 mg total) by mouth as needed (as needed at night).      ALPRAZolam 0.5 MG Oral Tablet Dispersible Take 1 tablet p.o. or 6 hours prior to the testing as needed, take medication at bedtime daily as needed 5 tablet 0    topiramate 25 MG Oral Tab Take 1 tablet (25 mg total) by mouth 2 (two) times daily. (Patient not taking: Reported on 5/4/2023) 90 tablet 0    predniSONE 10 MG Oral Tab Take 30 mg PO daily x 2 days, Take 20 mg PO daily x 2 days, Take 10 mg PO daily x 2 days 12 tablet 0    MONTELUKAST 10 MG Oral Tab  TAKE 1 TABLET(10 MG) BY MOUTH EVERY NIGHT (Patient not taking: Reported on 5/22/2024) 90 tablet 0    FLUTICASONE PROPIONATE 50 MCG/ACT Nasal Suspension SHAKE LIQUID AND USE 2 SPRAYS IN EACH NOSTRIL DAILY (Patient not taking: Reported on 5/22/2024) 16 g 2      Past Medical History:    Asthma (HCC)    with colds bronchial issues /uses inhaler occassionally    Bell's palsy    with pregnancy    Esophageal reflux    Fibroids    D&C 2000    History of blood transfusion    several yrs ago stomach ulcers    History of stomach ulcers    Osteoarthritis    Sciatica    Visual impairment    contacts /glasses      Past Surgical History:   Procedure Laterality Date    Back surgery  01/08/2018    Laminectomy    D & c  2000    Knee arthroscopy Left 01/11/2018    left knee arthroscopy    Other surgical history  1989    \"cyst removed from wrist\" per NG    Spine surgery procedure unlisted      Fusion L4-5    T&a  1963      Social History:  Social History     Socioeconomic History    Marital status:    Tobacco Use    Smoking status: Former     Current packs/day: 0.25     Average packs/day: 0.3 packs/day for 10.0 years (2.5 ttl pk-yrs)     Types: Cigarettes    Smokeless tobacco: Never   Vaping Use    Vaping status: Never Used   Substance and Sexual Activity    Alcohol use: Yes     Alcohol/week: 1.0 standard drink of alcohol     Types: 1 Glasses of wine per week     Comment: occassional    Drug use: No   Other Topics Concern    Caffeine Concern No      Family History   Problem Relation Age of Onset    Diabetes Father 74    Heart Disease Brother 57    Diabetes Brother     Colon Cancer Paternal Grandmother       Allergies   Allergen Reactions    Seasonal Coughing        REVIEW OF SYSTEMS:   Review of Systems   Review of Systems   Constitutional: Negative for activity change, appetite change and fever.   HENT: Negative for congestion and voice change.    Respiratory: Negative for cough and shortness of breath.    Cardiovascular:  Negative for chest pain.   Gastrointestinal: Negative for abdominal distention, abdominal pain and vomiting.   Genitourinary: Negative for hematuria.   Skin: Negative for wound.   Psychiatric/Behavioral: Negative for behavioral problems.   Wt Readings from Last 5 Encounters:   05/22/24 216 lb 9.6 oz (98.2 kg)   05/04/23 221 lb (100.2 kg)   05/03/23 221 lb (100.2 kg)   03/28/23 220 lb 6.4 oz (100 kg)   10/08/22 220 lb (99.8 kg)     Body mass index is 38.37 kg/m².      EXAM:   /84 (BP Location: Right arm, Patient Position: Sitting, Cuff Size: large)   Pulse 77   Temp 97.2 °F (36.2 °C) (Temporal)   Resp 18   Ht 5' 3\" (1.6 m)   Wt 216 lb 9.6 oz (98.2 kg)   SpO2 94%   BMI 38.37 kg/m²   Physical Exam   Constitutional:       Appearance: Normal appearance.   HENT:      Head: Normocephalic.   Eyes:      Conjunctiva/sclera: Conjunctivae normal.   Cardiovascular:      Rate and Rhythm: Normal rate and regular rhythm.      Heart sounds: Normal heart sounds. No murmur heard.  Pulmonary:      Effort: Pulmonary effort is normal.      Breath sounds: Normal breath sounds. No rhonchi or rales.   Abdominal:      General: Bowel sounds are normal.      Palpations: Abdomen is soft.      Tenderness: There is no abdominal tenderness.   Musculoskeletal:      Cervical back: Neck supple.      Right lower leg: No edema.      Left lower leg: No edema.   Skin:     General: Skin is warm and dry.   Neurological:      General: No focal deficit present.      Mental Status: He is alert and oriented to person, place, and time. Mental status is at baseline.   Psychiatric:         Mood and Affect: Mood normal.         Behavior: Behavior normal.       ASSESSMENT AND PLAN:   1. Acute bronchitis, unspecified organism  - increase hydration   - tylenol as needed  - benzonatate 200 MG Oral Cap; Take 1 capsule (200 mg total) by mouth 3 (three) times daily as needed for cough.  Dispense: 40 capsule; Refill: 1  - albuterol 108 (90 Base) MCG/ACT  Inhalation Aero Soln; Inhale 2 puffs into the lungs every 6 (six) hours as needed for Wheezing (cough).  Dispense: 1 each; Refill: 0  - predniSONE 10 MG Oral Tab; 4 tabs daily for 3 days, then 3 tabs daily for 3 days, then 2 tabs daily for 3 days, 1 tab daily for 3 days.  Dispense: 30 tablet; Refill: 0      The patient indicates understanding of these issues and agrees to the plan.      Ashley Spear MD

## 2024-05-22 NOTE — TELEPHONE ENCOUNTER
Patient started with coughing Yesterday.   Has constant cough spells, and then has trouble breathing.  No fever.   She tested for covid and negative.      Patient has hx of chronic cough/lung nodules.     Patient needs evaluation today.     Appointment made with available provider, Dr. Spear.     Reason for Disposition   Known COPD or other severe lung disease (i.e., bronchiectasis, cystic fibrosis, lung surgery) and worsening symptoms (i.e., increased sputum purulence or amount, increased breathing difficulty)    Protocols used: Cough-A-OH

## 2024-05-24 ENCOUNTER — HOSPITAL ENCOUNTER (OUTPATIENT)
Dept: GENERAL RADIOLOGY | Age: 69
Discharge: HOME | End: 2024-05-24
Attending: INTERNAL MEDICINE

## 2024-05-24 ENCOUNTER — OFFICE VISIT (OUTPATIENT)
Dept: INTERNAL MEDICINE CLINIC | Facility: CLINIC | Age: 69
End: 2024-05-24

## 2024-05-24 VITALS
BODY MASS INDEX: 38 KG/M2 | HEART RATE: 76 BPM | DIASTOLIC BLOOD PRESSURE: 73 MMHG | WEIGHT: 214 LBS | RESPIRATION RATE: 18 BRPM | SYSTOLIC BLOOD PRESSURE: 146 MMHG

## 2024-05-24 DIAGNOSIS — R73.03 PREDIABETES: ICD-10-CM

## 2024-05-24 DIAGNOSIS — Z00.00 MEDICARE ANNUAL WELLNESS VISIT, SUBSEQUENT: Primary | ICD-10-CM

## 2024-05-24 DIAGNOSIS — E03.9 ACQUIRED HYPOTHYROIDISM: ICD-10-CM

## 2024-05-24 DIAGNOSIS — Z11.1 SCREENING-PULMONARY TB: ICD-10-CM

## 2024-05-24 DIAGNOSIS — R05.3 CHRONIC COUGH: ICD-10-CM

## 2024-05-24 DIAGNOSIS — M85.89 OSTEOPENIA OF MULTIPLE SITES: ICD-10-CM

## 2024-05-24 DIAGNOSIS — E78.49 OTHER HYPERLIPIDEMIA: ICD-10-CM

## 2024-05-24 DIAGNOSIS — K44.9 HIATAL HERNIA: ICD-10-CM

## 2024-05-24 DIAGNOSIS — J45.41 MODERATE PERSISTENT ASTHMA WITH ACUTE EXACERBATION (HCC): ICD-10-CM

## 2024-05-24 DIAGNOSIS — R91.8 PULMONARY NODULES: ICD-10-CM

## 2024-05-24 DIAGNOSIS — Z12.31 SCREENING MAMMOGRAM FOR BREAST CANCER: ICD-10-CM

## 2024-05-24 DIAGNOSIS — I10 ESSENTIAL HYPERTENSION: ICD-10-CM

## 2024-05-24 DIAGNOSIS — I70.0 ATHEROSCLEROSIS OF AORTA (HCC): ICD-10-CM

## 2024-05-24 DIAGNOSIS — Z98.890 HISTORY OF LUMBAR SURGERY: ICD-10-CM

## 2024-05-24 PROBLEM — J41.0 SMOKERS' COUGH (HCC): Chronic | Status: ACTIVE | Noted: 2024-05-24

## 2024-05-24 PROBLEM — E66.01 SEVERE OBESITY (BMI 35.0-39.9) WITH COMORBIDITY (HCC): Chronic | Status: ACTIVE | Noted: 2024-05-24

## 2024-05-24 PROCEDURE — 71046 X-RAY EXAM CHEST 2 VIEWS: CPT | Performed by: INTERNAL MEDICINE

## 2024-05-24 NOTE — PROGRESS NOTES
Subjective:   Shayna Perez is a 68 year old female who presents for a MA (Medicare Advantage) Supervisit (Once per calendar year) and scheduled follow up of multiple significant but stable problems and acute exacerbation of a chronic illness.     Patient returned from [place] contracted coughing illness there she always has chronic cough which presents as a coughing spells for last 2 years, being monitored with CT scans for multiple pulmonary nodules, under care of pulmonologist.  She had difficulty walking [facility] few steps were causing significant shortness of breath.  2 days ago she was started on prednisone completing 3 days of 40 mg will taper down over the next 9 days, using albuterol inhaler, states that shortness of breath decreased, still has frequent cough and wheezing.,  Able to sleep at night.  She is battling weight, was able to lose weight during COVID and now weight is back.  She will try to change diet again goal low-carb and low-fat.    History/Other:   Fall Risk Assessment:   She has been screened for Falls and is low risk.      Cognitive Assessment:   She had a completely normal cognitive assessment - see flowsheet entries     Functional Ability/Status:   Shayna Perez has some abnormal functions as listed below:  She has Walking problems based on screening of functional status.       Depression Screening (PHQ-2/PHQ-9): PHQ-2 SCORE: 0  , done 5/24/2024        Advanced Directives:   She does have a Living Will but we do NOT have it on file in Epic.    She does have a POA but we do NOT have it on file in Epic.        Patient Active Problem List   Diagnosis    Primary osteoarthritis of left knee    Essential hypertension    History of lumbar surgery    Pulmonary nodules    Acquired hypothyroidism    Smokers' cough (HCC)    Severe obesity (BMI 35.0-39.9) with comorbidity (HCC)     Allergies:  She is allergic to seasonal.    Current Medications:  Outpatient Medications Marked as Taking for  the 5/24/24 encounter (Office Visit) with Lorna Baum MD   Medication Sig    benzonatate 200 MG Oral Cap Take 1 capsule (200 mg total) by mouth 3 (three) times daily as needed for cough.    albuterol 108 (90 Base) MCG/ACT Inhalation Aero Soln Inhale 2 puffs into the lungs every 6 (six) hours as needed for Wheezing (cough).    predniSONE 10 MG Oral Tab 4 tabs daily for 3 days, then 3 tabs daily for 3 days, then 2 tabs daily for 3 days, 1 tab daily for 3 days.    Irbesartan 150 MG Oral Tab Take 1 tablet (150 mg total) by mouth nightly.    pantoprazole 40 MG Oral Tab EC Take 1 tablet (40 mg total) by mouth before breakfast.    Omega-3 Fatty Acids (OMEGA-3 FISH OIL) 1000 MG Oral Cap Take by mouth As Directed.    Multiple Vitamins-Minerals (ZINC OR) Take 1 tablet by mouth daily.    Cholecalciferol (D3 OR) Take 1 tablet by mouth daily.    IRON OR Take 1 tablet by mouth daily.    Ascorbic Acid (VITAMIN C OR) Take 2 tablets by mouth daily.    Melatonin 5 MG Oral Tab Take 2 tablets (10 mg total) by mouth as needed (as needed at night).       Medical History:  She  has a past medical history of Asthma (HCC), Bell's palsy, Esophageal reflux, Fibroids, History of blood transfusion, History of stomach ulcers, Osteoarthritis, Sciatica (2010), and Visual impairment.  Surgical History:  She  has a past surgical history that includes other surgical history (1989); d & c (2000); back surgery (01/08/2018); spine surgery procedure unlisted; knee arthroscopy (Left, 01/11/2018); and t&a (1963).   Family History:  Her family history includes Colon Cancer in her paternal grandmother; Diabetes in her brother; Diabetes (age of onset: 74) in her father; Heart Disease (age of onset: 57) in her brother.  Social History:  She  reports that she has quit smoking. Her smoking use included cigarettes. She has a 2.5 pack-year smoking history. She has never been exposed to tobacco smoke. She has never used smokeless tobacco. She reports current  alcohol use of about 1.0 standard drink of alcohol per week. She reports that she does not use drugs.    Tobacco:  She smoked tobacco in the past but quit greater than 12 months ago.  Social History     Tobacco Use   Smoking Status Former    Current packs/day: 0.25    Average packs/day: 0.3 packs/day for 10.0 years (2.5 ttl pk-yrs)    Types: Cigarettes    Passive exposure: Never   Smokeless Tobacco Never        CAGE Alcohol Screen:   CAGE screening score of 0 on 5/24/2024, showing low risk of alcohol abuse.      Patient Care Team:  Lorna Baum MD as PCP - General (Internal Medicine)    Review of Systems       Constitutional:  Negative for decreased activity, fever, irritability and lethargy  Cardiovascular:  Negative for chest pain and irregular heartbeat/palpitations    Eyes:  Negative for eye discharge and vision loss  Endocrine:  Negative for polydipsia and polyphagia  Integumentary:  Negative for pruritus and rash  Neurological:  Negative for gait disturbance, paresthesias.   Psychiatric:  Negative for inappropriate interaction and psychiatric symptoms      Physical Exam  Constitutional:       Appearance: Normal appearance. She is obese.   HENT:      Head: Normocephalic and atraumatic.      Right Ear: Tympanic membrane, ear canal and external ear normal. There is no impacted cerumen.      Left Ear: Tympanic membrane, ear canal and external ear normal. There is no impacted cerumen.   Eyes:      Extraocular Movements: Extraocular movements intact.      Conjunctiva/sclera: Conjunctivae normal.      Pupils: Pupils are equal, round, and reactive to light.   Neck:      Vascular: No carotid bruit.   Cardiovascular:      Rate and Rhythm: Normal rate and regular rhythm.      Heart sounds: No murmur heard.  Pulmonary:      Effort: Pulmonary effort is normal.      Breath sounds: Wheezing present.   Abdominal:      General: Bowel sounds are normal.      Palpations: Abdomen is soft. There is no mass.      Tenderness:  There is no abdominal tenderness. There is no guarding or rebound.   Musculoskeletal:         General: Normal range of motion.      Cervical back: Normal range of motion and neck supple.      Right lower leg: No edema.      Left lower leg: No edema.   Lymphadenopathy:      Cervical: No cervical adenopathy.   Neurological:      General: No focal deficit present.      Mental Status: She is alert and oriented to person, place, and time. Mental status is at baseline.   Psychiatric:         Mood and Affect: Mood normal.         Behavior: Behavior normal.         Thought Content: Thought content normal.         Judgment: Judgment normal.           /73 (BP Location: Right arm, Patient Position: Sitting, Cuff Size: large)   Pulse 76   Resp 18   Wt 214 lb (97.1 kg)   BMI 37.91 kg/m²  Estimated body mass index is 37.91 kg/m² as calculated from the following:    Height as of 5/22/24: 5' 3\" (1.6 m).    Weight as of this encounter: 214 lb (97.1 kg).    Medicare Hearing Assessment:   Hearing Screening    Time taken: 5/24/2024  1:21 PM  Entry User: Eva Dai MA  Screening Method: Finger Rub  Finger Rub Result: Pass         Visual Acuity:   Right Eye Visual Acuity: Corrected     Left Eye Visual Acuity: Corrected     Both Eyes Visual Acuity: Corrected Both Eyes Chart Acuity: 20/20   Able To Tolerate Visual Acuity: Yes        Assessment & Plan:   Shayna Perez is a 68 year old female who presents for a Medicare Assessment.     1. Medicare annual wellness visit, subsequent (Primary)  2. Moderate persistent asthma with acute exacerbation (HCC) slightly improvement on tapering down prednisone, continue albuterol inhaler every 4-6 hours  3. Chronic cough etiology to be determined, will order pulmonary function test when acute situation subsides, see ENT specialist, will get chest x-ray, ANCA panel vasculitis ordered double-stranded DNA ordered, inflammatory markers ordered QuantiFERON test ordered  -     Pulmonary  Function Test; Future; Expected date: 05/24/2024  -     ENT Referral - In Network  -     XR CHEST PA + LAT CHEST (CPT=71046); Future; Expected date: 05/24/2024  -     ANCA Panel Vasculitis; Future; Expected date: 05/24/2024  -     Anti-dsDNA Antibody, IgG; Future; Expected date: 05/24/2024  -     Sed Rate, Westergren (Automated); Future; Expected date: 05/24/2024  -     C-Reactive Protein; Future; Expected date: 05/24/2024  -     Quantiferon TB Plus; Future; Expected date: 05/24/2024  4. Acquired hypothyroidism stable clinically will recheck TSH with reflex  5. Essential hypertension controlled on current medications today is up due to acute illness  -     CBC With Differential With Platelet; Future; Expected date: 05/24/2024  -     Comp Metabolic Panel (14); Future; Expected date: 05/24/2024  6. History of lumbar surgery stable no treatment needed  7. Pulmonary nodules negative PET scan in 2023, being monitored with serial CT scans of the lungs  -     ANCA Panel Vasculitis; Future; Expected date: 05/24/2024  -     Anti-dsDNA Antibody, IgG; Future; Expected date: 05/24/2024  -     Sed Rate, Westergren (Automated); Future; Expected date: 05/24/2024  -     C-Reactive Protein; Future; Expected date: 05/24/2024  8. Prediabetes will check hemoglobin A1c every 6 months continue low carbohydrate diet attempts to lose weight  -     Hemoglobin A1C; Future; Expected date: 05/24/2024  9. Screening mammogram for breast cancer  -     [facility] PEPE 2D+3D SCREENING BILAT (CPT=77067/38283); Future; Expected date: 05/24/2024  10. Osteopenia of multiple sites DEXA scan ordered  -     XR DEXA BONE DENSITOMETRY (CPT=77080); Future; Expected date: 05/24/2024  11. Other hyperlipidemia check lipids, continue low-fat low-cholesterol diet treat if necessary  -     Lipid Panel; Future; Expected date: 05/24/2024    The patient indicates understanding of these issues and agrees to the plan.    Follow-up in 7 to 10 days    Lorna Baum MD,  5/24/2024     Supplementary Documentation:   General Health:  In the past six months, have you lost more than 10 pounds without trying?: 2 - No  Has your appetite been poor?: No  Type of Diet: Other (KETO)  How does the patient maintain a good energy level?: Daily Walks  How would you describe your daily physical activity?: Moderate  How would you describe your current health state?: Fair  How do you maintain positive mental well-being?: Social Interaction;Visiting Friends;Visiting Family  On a scale of 0 to 10, with 0 being no pain and 10 being severe pain, what is your pain level?: 0 - (None)  In the past six months, have you experienced urine leakage?: 1-Yes  At any time do you feel concerned for the safety/well-being of yourself and/or your children, in your home or elsewhere?: No  Have you had any immunizations at another office such as Influenza, Hepatitis B, Tetanus, or Pneumococcal?: No       Shayna Perez's SCREENING SCHEDULE   Tests on this list are recommended by your physician but may not be covered, or covered at this frequency, by your insurer.   Please check with your insurance carrier before scheduling to verify coverage.   PREVENTATIVE SERVICES FREQUENCY &  COVERAGE DETAILS LAST COMPLETION DATE   Diabetes Screening    Fasting Blood Sugar /  Glucose    One screening every 12 months if never tested or if previously tested but not diagnosed with pre-diabetes   One screening every 6 months if diagnosed with pre-diabetes Lab Results   Component Value Date     (H) 08/21/2023        Cardiovascular Disease Screening    Lipid Panel  Cholesterol  Lipoprotein (HDL)  Triglycerides Covered every 5 years for all Medicare beneficiaries without apparent signs or symptoms of cardiovascular disease Lab Results   Component Value Date    CHOLEST 281 (H) 08/21/2023    HDL 57 08/21/2023     (H) 08/21/2023    TRIG 161 (H) 08/21/2023         Electrocardiogram (EKG)   Covered if needed at Welcome to Medicare,  and non-screening if indicated for medical reasons 12/28/2018      Ultrasound Screening for Abdominal Aortic Aneurysm (AAA) Covered once in a lifetime for one of the following risk factors    Men who are 65-75 years old and have ever smoked    Anyone with a family history -     Colorectal Cancer Screening  Covered for ages 50-85; only need ONE of the following:    Colonoscopy   Covered every 10 years    Covered every 2 years if patient is at high risk or previous colonoscopy was abnormal 08/14/2015    Health Maintenance   Topic Date Due    Colorectal Cancer Screening  08/14/2025       Flexible Sigmoidoscopy   Covered every 4 years -    Fecal Occult Blood Test Covered annually -   Bone Density Screening    Bone density screening    Covered every 2 years after age 65 if diagnosed with risk of osteoporosis or estrogen deficiency.    Covered yearly for long-term glucocorticoid medication use (Steroids) No results found for this or any previous visit.      Health Maintenance Due   Topic Date Due    DEXA Scan  Never done      Pap and Pelvic    Pap   Covered every 2 years for women at normal risk; Annually if at high risk -  No recommendations at this time    Chlamydia Annually if high risk -  No recommendations at this time   Screening Mammogram    Mammogram     Recommend annually for all female patients aged 40 and older    One baseline mammogram covered for patients aged 35-39 05/04/2023    Health Maintenance   Topic Date Due    Mammogram  05/04/2024       Immunizations    Influenza Covered once per flu season  Please get every year -  No recommendations at this time    Pneumococcal Each vaccine ([id number] & [id number]) covered once after 65 Prevnar 13: -    [id number]: -     No recommendations at this time    Hepatitis B One screening covered for patients with certain risk factors   -  No recommendations at this time    Tetanus Toxoid Not covered by Medicare Part B unless medically necessary (cut with metal); may be  covered with your pharmacy prescription benefits -    Tetanus, Diptheria and Pertusis TD and TDaP Not covered by Medicare Part B -  No recommendations at this time    Zoster Not covered by Medicare Part B; may be covered with your pharmacy  prescription benefits -  No recommendations at this time     Annual Monitoring of Persistent Medications (ACE/ARB, digoxin diuretics, anticonvulsants)    Potassium Annually Lab Results   Component Value Date    K 4.1 08/21/2023         Creatinine   Annually Lab Results   Component Value Date    CREATSERUM 0.86 08/21/2023         BUN Annually Lab Results   Component Value Date    BUN 13 08/21/2023       Drug Serum Conc Annually No results found for: \"DIGOXIN\", \"DIG\", \"VALP\"           Chronic Obstructive Pulmonary Disease (COPD)    Spirometry Annually Spirometry date:

## 2024-05-27 ENCOUNTER — TELEPHONE (OUTPATIENT)
Dept: INTERNAL MEDICINE CLINIC | Facility: CLINIC | Age: 69
End: 2024-05-27

## 2024-05-27 PROBLEM — J41.0 SMOKERS' COUGH (HCC): Chronic | Status: RESOLVED | Noted: 2024-05-24 | Resolved: 2024-05-27

## 2024-05-27 NOTE — TELEPHONE ENCOUNTER
YAJAIRA I saw patient recently he has chronic cough for last couple years being watched with serial CT scans, she is wondering if she should have bronchoscopy?  I have ordered pulmonary function test with ongoing exacerbation is gone and additional blood test workup.  Please see patient if you feel appropriate

## 2024-05-29 ENCOUNTER — NURSE TRIAGE (OUTPATIENT)
Dept: INTERNAL MEDICINE CLINIC | Facility: CLINIC | Age: 69
End: 2024-05-29

## 2024-05-29 ENCOUNTER — TELEPHONE (OUTPATIENT)
Dept: INTERNAL MEDICINE CLINIC | Facility: CLINIC | Age: 69
End: 2024-05-29

## 2024-05-29 RX ORDER — PREDNISONE 10 MG/1
TABLET ORAL
Qty: 9 TABLET | Refills: 0 | Status: SHIPPED | OUTPATIENT
Start: 2024-05-29

## 2024-05-29 NOTE — TELEPHONE ENCOUNTER
Patient stated that she saw Dr Baum on 5/24/2024 and was calling back with an update on how she was doing. Stated that her phlegm has loosened up in her chest. The wheezing is still on going and maybe even more now since the phlegm is loosening. Has been taking the prednisone and has 4 days left. Has been having trouble sleeping since taking the prednisone after 2pm since that is when she started it. No other symptoms. Wanted to know if ok to take the prednisone in the morning now or if the will screw up the schedule with the medication? Please advise.

## 2024-05-29 NOTE — TELEPHONE ENCOUNTER
Spoke to patient, she states that she is not doing much, chest is much looser, she may hear wheezing when she lays down at night, pulse oximeter shows 97% on room air.  Not notably using albuterol inhaler encouraged her to use albuterol inhaler every 6 hours for sure before bedtime.  I advised that steroids can interfere with a good sleep.  Advised patient to stay on prednisone 30 mg for 2 more days and after that starting tapering down I will send new prescription for taper.  Asked patient to do blood work which includes BMP to sort out if she has element of congestive heart failure.  Also advised patient if she is feeling worse she should go to emergency room.  Patient did not sound in respiratory distress when she was talking to me in full sentences without rest

## 2024-05-31 ENCOUNTER — LAB ENCOUNTER (OUTPATIENT)
Dept: LAB | Age: 69
End: 2024-05-31
Attending: INTERNAL MEDICINE
Payer: MEDICARE

## 2024-05-31 DIAGNOSIS — E78.49 OTHER HYPERLIPIDEMIA: ICD-10-CM

## 2024-05-31 DIAGNOSIS — R73.03 PREDIABETES: ICD-10-CM

## 2024-05-31 DIAGNOSIS — Z11.1 SCREENING-PULMONARY TB: ICD-10-CM

## 2024-05-31 DIAGNOSIS — R06.02 SHORTNESS OF BREATH: ICD-10-CM

## 2024-05-31 DIAGNOSIS — R05.3 CHRONIC COUGH: ICD-10-CM

## 2024-05-31 DIAGNOSIS — I10 ESSENTIAL HYPERTENSION: ICD-10-CM

## 2024-05-31 DIAGNOSIS — R91.8 PULMONARY NODULES: ICD-10-CM

## 2024-05-31 LAB
ALBUMIN SERPL-MCNC: 4.8 G/DL (ref 3.2–4.8)
ALBUMIN/GLOB SERPL: 1.8 {RATIO} (ref 1–2)
ALP LIVER SERPL-CCNC: 70 U/L
ALT SERPL-CCNC: 29 U/L
ANION GAP SERPL CALC-SCNC: 6 MMOL/L (ref 0–18)
AST SERPL-CCNC: 14 U/L (ref ?–34)
BASOPHILS # BLD AUTO: 0.05 X10(3) UL (ref 0–0.2)
BASOPHILS NFR BLD AUTO: 0.4 %
BILIRUB SERPL-MCNC: 0.5 MG/DL (ref 0.2–1.1)
BNP SERPL-MCNC: 34 PG/ML
BUN BLD-MCNC: 23 MG/DL (ref 9–23)
BUN/CREAT SERPL: 27.7 (ref 10–20)
CALCIUM BLD-MCNC: 10 MG/DL (ref 8.7–10.4)
CHLORIDE SERPL-SCNC: 102 MMOL/L (ref 98–112)
CHOLEST SERPL-MCNC: 262 MG/DL (ref ?–200)
CO2 SERPL-SCNC: 33 MMOL/L (ref 21–32)
CREAT BLD-MCNC: 0.83 MG/DL
CRP SERPL-MCNC: <0.4 MG/DL (ref ?–1)
DEPRECATED RDW RBC AUTO: 43.2 FL (ref 35.1–46.3)
EGFRCR SERPLBLD CKD-EPI 2021: 77 ML/MIN/1.73M2 (ref 60–?)
EOSINOPHIL # BLD AUTO: 0.05 X10(3) UL (ref 0–0.7)
EOSINOPHIL NFR BLD AUTO: 0.4 %
ERYTHROCYTE [DISTWIDTH] IN BLOOD BY AUTOMATED COUNT: 13.1 % (ref 11–15)
ERYTHROCYTE [SEDIMENTATION RATE] IN BLOOD: 20 MM/HR
EST. AVERAGE GLUCOSE BLD GHB EST-MCNC: 134 MG/DL (ref 68–126)
FASTING PATIENT LIPID ANSWER: YES
FASTING STATUS PATIENT QL REPORTED: YES
GLOBULIN PLAS-MCNC: 2.6 G/DL (ref 2–3.5)
GLUCOSE BLD-MCNC: 126 MG/DL (ref 70–99)
HBA1C MFR BLD: 6.3 % (ref ?–5.7)
HCT VFR BLD AUTO: 44.4 %
HDLC SERPL-MCNC: 71 MG/DL (ref 40–59)
HGB BLD-MCNC: 14.6 G/DL
IMM GRANULOCYTES # BLD AUTO: 0.17 X10(3) UL (ref 0–1)
IMM GRANULOCYTES NFR BLD: 1.2 %
LDLC SERPL CALC-MCNC: 173 MG/DL (ref ?–100)
LYMPHOCYTES # BLD AUTO: 2.8 X10(3) UL (ref 1–4)
LYMPHOCYTES NFR BLD AUTO: 20.2 %
MCH RBC QN AUTO: 29.6 PG (ref 26–34)
MCHC RBC AUTO-ENTMCNC: 32.9 G/DL (ref 31–37)
MCV RBC AUTO: 89.9 FL
MONOCYTES # BLD AUTO: 0.72 X10(3) UL (ref 0.1–1)
MONOCYTES NFR BLD AUTO: 5.2 %
NEUTROPHILS # BLD AUTO: 10.07 X10 (3) UL (ref 1.5–7.7)
NEUTROPHILS # BLD AUTO: 10.07 X10(3) UL (ref 1.5–7.7)
NEUTROPHILS NFR BLD AUTO: 72.6 %
NONHDLC SERPL-MCNC: 191 MG/DL (ref ?–130)
OSMOLALITY SERPL CALC.SUM OF ELEC: 297 MOSM/KG (ref 275–295)
PLATELET # BLD AUTO: 267 10(3)UL (ref 150–450)
POTASSIUM SERPL-SCNC: 4.6 MMOL/L (ref 3.5–5.1)
PROT SERPL-MCNC: 7.4 G/DL (ref 5.7–8.2)
RBC # BLD AUTO: 4.94 X10(6)UL
SODIUM SERPL-SCNC: 141 MMOL/L (ref 136–145)
TRIGL SERPL-MCNC: 105 MG/DL (ref 30–149)
VLDLC SERPL CALC-MCNC: 21 MG/DL (ref 0–30)
WBC # BLD AUTO: 13.9 X10(3) UL (ref 4–11)

## 2024-05-31 PROCEDURE — 85025 COMPLETE CBC W/AUTO DIFF WBC: CPT

## 2024-05-31 PROCEDURE — 80053 COMPREHEN METABOLIC PANEL: CPT

## 2024-05-31 PROCEDURE — 86480 TB TEST CELL IMMUN MEASURE: CPT

## 2024-05-31 PROCEDURE — 83036 HEMOGLOBIN GLYCOSYLATED A1C: CPT

## 2024-05-31 PROCEDURE — 83516 IMMUNOASSAY NONANTIBODY: CPT

## 2024-05-31 PROCEDURE — 83880 ASSAY OF NATRIURETIC PEPTIDE: CPT

## 2024-05-31 PROCEDURE — 80061 LIPID PANEL: CPT

## 2024-05-31 PROCEDURE — 86140 C-REACTIVE PROTEIN: CPT

## 2024-05-31 PROCEDURE — 36415 COLL VENOUS BLD VENIPUNCTURE: CPT

## 2024-05-31 PROCEDURE — 86037 ANCA TITER EACH ANTIBODY: CPT

## 2024-05-31 PROCEDURE — 85652 RBC SED RATE AUTOMATED: CPT

## 2024-05-31 PROCEDURE — 86225 DNA ANTIBODY NATIVE: CPT

## 2024-05-31 NOTE — TELEPHONE ENCOUNTER
YAJAIRA I saw patient recently he has chronic cough for last couple years being watched with serial CT scans, she is wondering if she should have bronchoscopy?  I have ordered pulmonary function test with ongoing exacerbation is gone and additional blood test workup.  Please see patient if you feel appropriate        
Patient is returning the nurses call from Dr Smith's office. First opening for yearly follow up is in December. Patient states that she does have PFT scheduled for 6/6/24 and patient also will be having a echo cardiogram on 6/27/24 and bone density test is scheduled 6/29/24 and a mammogram on same date. Should the patient be scheduled for a sooner date?   
Patient returning RN's call.  She states that she will be available until 2 pm.  Please call  
RN, please reach out to the patient.  I saw her a year ago for pulmonary nodules which have been stable.  Encouraged her to make an appointment to see me in follow-up sometime this summer for a cough.  No emergency.  She can be added on as a follow-up.  
Spoke with patient, follow up appointment scheduled with Dr. Smith on 7/15/24 at 12pm, verified date, time, location & parking, patient verbalized understanding.  
no
No difficulties

## 2024-06-03 ENCOUNTER — TELEPHONE (OUTPATIENT)
Dept: INTERNAL MEDICINE CLINIC | Facility: CLINIC | Age: 69
End: 2024-06-03

## 2024-06-03 LAB
M TB IFN-G CD4+ T-CELLS BLD-ACNC: 0.04 IU/ML
M TB TUBERC IFN-G BLD QL: NEGATIVE
M TB TUBERC IGNF/MITOGEN IGNF CONTROL: 3.94 IU/ML
QFT TB1 AG MINUS NIL: 0 IU/ML
QFT TB2 AG MINUS NIL: 0 IU/ML

## 2024-06-04 LAB
ANTI-MPO ANTIBODIES: <0.2 UNITS
ANTI-PR3 ANTIBODIES: <0.2 UNITS

## 2024-06-05 LAB — DSDNA IGG SERPL IA-ACNC: 4.1 IU/ML

## 2024-06-06 ENCOUNTER — HOSPITAL ENCOUNTER (OUTPATIENT)
Dept: RESPIRATORY THERAPY | Facility: HOSPITAL | Age: 69
Discharge: HOME | End: 2024-06-06
Attending: INTERNAL MEDICINE
Payer: MEDICARE

## 2024-06-06 DIAGNOSIS — R05.3 CHRONIC COUGH: ICD-10-CM

## 2024-06-06 PROCEDURE — 94060 EVALUATION OF WHEEZING: CPT | Performed by: INTERNAL MEDICINE

## 2024-06-06 PROCEDURE — 94726 PLETHYSMOGRAPHY LUNG VOLUMES: CPT | Performed by: INTERNAL MEDICINE

## 2024-06-06 PROCEDURE — 94729 DIFFUSING CAPACITY: CPT | Performed by: INTERNAL MEDICINE

## 2024-06-07 NOTE — PROCEDURES
[facility]  part of [facility]     Pulmonary Function Test     Shayna Perez Patient Status:  Outpatient    1955 MRN [id number]   Date of Exam 24 PCP Lorna Baum MD           Spirometry   FEV1: 1.22 59%  FVC: 2.13 81%  FEV1/FVC: 0.57    Lung Volume   T.82 101%  RV : 2.63 146%    Diffusion Capacity   DLCO: 23.45 127%    Flow Volume Loop       Impression   Moderate obstructive defect seen without significant postbronchodilator response observed.  Normal lung volumes with some air trapping.  Normal diffusion capacity    Shawn Kennedy DO  Pulmonary Critical Care Medicine  [facility]

## 2024-06-08 ENCOUNTER — PATIENT MESSAGE (OUTPATIENT)
Dept: INTERNAL MEDICINE CLINIC | Facility: CLINIC | Age: 69
End: 2024-06-08

## 2024-06-10 RX ORDER — FLUTICASONE PROPIONATE AND SALMETEROL XINAFOATE 115; 21 UG/1; UG/1
2 AEROSOL, METERED RESPIRATORY (INHALATION) 2 TIMES DAILY
Qty: 1 EACH | Refills: 0 | Status: SHIPPED | OUTPATIENT
Start: 2024-06-10

## 2024-06-10 NOTE — TELEPHONE ENCOUNTER
Spoke to patient, advised her to try Advair inhaler 2 puffs every 12 hours on a regular basis or anything similar covered by insurance, ask you to report in a couple weeks if inhaler is helpful.

## 2024-06-10 NOTE — TELEPHONE ENCOUNTER
From: Shayna Perez  To: Lorna Baum  Sent: 6/8/2024 2:12 AM CDT  Subject: Pulmonary function     Am feeling a bit better - had a great day yesterday for the test (actually arrived a half hour early, but then spent a half hour walking around (slowly) trying to find the Diagnostics Main check-in desk; was the only time in LONG time I was able to walk farther than a few steps-at a time without feeling winded. DID notice that the later I thought I’d be for the appointment, the more strained my breathing became!) I WAS a bit weak after the test - tired lungs (like I felt as a kid from swimming all day). Woke up today and felt sluggish - napped this afternoon and was able to sleep deeply - prob because no more steroids? Chest still feels tight and had one of the sporadic coughing spells before bedtime because I forgot to take my Mucinex and Claritin at 7 pm (they work best when I “stay ahead of” the symptoms). Used the albuterol with the added chamber attachment (to allow more time to get it into my lungs - from pulmo nurse) and seems to help a bit after about a 10 minute wait. I do   better (no cough or wheeze) when I don’t breathe deeply - but pulmo nurse said to try to really fill up my lungs … so - deep breaths cause the cough to continue. Not able to cough up anything (as I was able to before), but not “whistling” right now as I exhale while lieing down. Have echo scheduled as well as mammo and bone density tests in a week or two. Can’t believe you’re working so late on a Friday. Thank you. Please get some sleeeep!!

## 2024-06-11 ENCOUNTER — TELEPHONE (OUTPATIENT)
Dept: INTERNAL MEDICINE CLINIC | Facility: CLINIC | Age: 69
End: 2024-06-11

## 2024-06-11 RX ORDER — FLUTICASONE FUROATE AND VILANTEROL 200; 25 UG/1; UG/1
1 POWDER RESPIRATORY (INHALATION) DAILY
Qty: 1 EACH | Refills: 1 | Status: SHIPPED | OUTPATIENT
Start: 2024-06-11

## 2024-06-11 NOTE — TELEPHONE ENCOUNTER
Current Outpatient Medications:     fluticasone-salmeterol 115-21 MCG/ACT Inhalation Aerosol, Inhale 2 puffs into the lungs 2 (two) times daily. Gargle throat after use of inhaler, Disp: 1 each, Rfl: 0      Drug not covered by patient plan. The preferred alternative is :  WIZELAINHUBAER, FLUTICSALMEAER, BREOELLIPTAINH,BUDESFORMOTAER.  Pls call/fax to pharmacy to change medication along with strength quantity and refills

## 2024-06-17 NOTE — TELEPHONE ENCOUNTER
Irbesartan 150 MG Oral Tab, Take 1 tablet (150 mg total) by mouth nightly., Disp: 30 tablet, Rfl: 0

## 2024-06-19 DIAGNOSIS — J20.9 ACUTE BRONCHITIS, UNSPECIFIED ORGANISM: ICD-10-CM

## 2024-06-19 NOTE — TELEPHONE ENCOUNTER
Please Review. Protocol Failed; No Protocol   BP Readings from Last 1 Encounters:   05/24/24 146/73   Recent Visits  Date Type Provider Dept   05/24/24 Office Visit Lorna Baum MD [facility] Med[facility]   05/22/24 Office Visit Ashley Spear MD [facility]   05/03/23 Office Visit Lorna Baum MD [facility]   03/28/23 Office Visit Lorna Baum MD [facility]   Showing recent visits within past 540 days with a meds authorizing provider and meeting all other requirements  Future Appointments  No visits were found meeting these conditions.  Showing future appointments within next 150 days with a meds authorizing provider and meeting all other requirements    Requested Prescriptions   Pending Prescriptions Disp Refills    Irbesartan 150 MG Oral Tab 30 tablet 0     Sig: Take 1 tablet (150 mg total) by mouth nightly.       Hypertension Medications Protocol Failed - 6/17/2024  2:47 PM        Failed - Last BP reading less than 140/90     BP Readings from Last 1 Encounters:   05/24/24 146/73               Passed - CMP or BMP in past 12 months        Passed - In person appointment or virtual visit in the past 12 mos or appointment in next 3 mos     Recent Outpatient Visits              3 weeks ago Medicare [facility]    Office Visit    4 weeks ago Acute bronchitis, unspecified organism    [facility] [address], [place] Ashley Spear MD    Office Visit    1 year ago Pulmonary nodules    [facility] [place] Dre Smith MD    Office Visit    1 year ago [facility] nodules    [facility]    Office Visit    1 year ago Primary hypertension    [facility] [address], [place] Lorna Baum MD    Office Visit          Future Appointments         Provider  Department Appt Notes    In 1 week [facility] CARD RM3 [facility] [place] Cardiodiagnostics     In 1 week [facility] DEXA RM1; [facility] RM1 [facility]     In 1 week [facility] RM1; [facility] DEXA RM1 [facility] DEXA - [place] Chest issues; general bone density    In 3 weeks Dre Smith MD [facility] cough    In 8 months [facility] CT RM1 [facility] CT - [place] Per Dr Smith orders- lung nodules, thyroid                    Passed - EGFRCR or GFRNAA > 50     GFR Evaluation  EGFRCR: 77 , resulted on 5/31/2024                 Future Appointments         Provider Department Appt Notes    In 1 week [facility] CARD RM3 [facility] [place] Cardiodiagnostics     In 1 week [facility] DEXA RM1; [facility] CHAPIN RM1 [facility] Mammography - [place]     In 1 week [facility] RM1; [facility] DEXA RM1 [facility] DEXA - [place] Chest issues; general bone density    In 3 weeks Dre Smith MD [facility] cough    In 8 months [facility] CT RM1 [facility] CT - [place] Per Dr Smith orders- lung nodules, thyroid          Recent Outpatient Visits              3 weeks ago Medicare annual wellness visit, subsequent    [facility], [address], [place] Lorna Baum MD    Office Visit    4 weeks ago Acute bronchitis, unspecified organism    [facility], [address], [place] Ashley Spear MD    Office Visit    1 year ago Pulmonary nodules    [facility] Dre Smith MD    Office Visit    1 year ago Pulmonary nodules    [facility], [address], [place] Lorna Baum MD    Office Visit    1 year ago Primary hypertension    [facility], [address], [place] Lorna Baum MD    Office Visit

## 2024-06-19 NOTE — TELEPHONE ENCOUNTER
[facility] calling to inquire on status of refill for patient.   When I asked if patient was out of meds, the pharmacy team member said \"no comment\".     I advised we do have a 3 business day turn around time policy on these, request received 6/17/24

## 2024-06-20 RX ORDER — IRBESARTAN 150 MG/1
150 TABLET ORAL NIGHTLY
Qty: 90 TABLET | Refills: 3 | Status: SHIPPED | OUTPATIENT
Start: 2024-06-20

## 2024-06-21 NOTE — TELEPHONE ENCOUNTER
Please review. Protocol Failed; No Protocol    Requested Prescriptions   Pending Prescriptions Disp Refills    ALBUTEROL 108 (90 Base) MCG/ACT Inhalation Aero Soln [Pharmacy Med Name: ALBUTEROL HFA INH (200 PUFFS) 8.5GM] 8.5 g 0     Sig: INHALE 2 PUFFS INTO THE LUNGS EVERY 6 HOURS AS NEEDED FOR WHEEZING OR COUGH       Asthma & COPD Medication Protocol Failed - 6/19/2024 11:36 AM        Failed - Asthma Action Score greater than or equal to 20        Failed - AAP/ACT given in last 12 months     No data recorded  No data recorded  No data recorded  No data recorded          Passed - Appointment in past 6 or next 3 months      Recent Outpatient Visits              4 weeks ago Medicare annual wellness visit, subsequent    [facility], [address], [place] Lorna Baum MD    Office Visit    1 month ago Acute bronchitis, unspecified organism    [facility], [address], [place] Ashley Spear MD    Office Visit    1 year ago Pulmonary nodules    [facility] Dre Smith MD    Office Visit    1 year ago Pulmonary nodules    [facility], [address], [place] Lorna Baum MD    Office Visit    1 year ago Primary hypertension    [facility], [address], [place] Lorna Baum MD    Office Visit          Future Appointments         Provider Department Appt Notes    In 6 days EM CARD RM3 ECHO [facility] Cardiodiagnostics     In 1 week [facility] DEXA RM1; [facility] CHAPIN RM1 [facility] Mammography - [place]     In 1 week [facility] CHAPIN RM1; [facility] DEXA RM1 [facility] DEXA - [place] Chest issues; general bone density    In 3 weeks Dre Smith MD [facility] cough    In 8 months L[facility] CT RM1 [facility] CT - [place] Per Dr Smith orders- lung nodules, thyroid                           Future Appointments         Provider Department Appt Notes    In 6  days [facility] CARD RM3 ECHO [facility] Cardiodiagnostics     In 1 week [facility] DEXA RM1; [facility] CHAPIN RM1 [facility] Mammography - [place]     In 1 week [facility] CHAPIN RM1; [facility] DEXA RM1 [facility] DEXA - [place] Chest issues; general bone density    In 3 weeks Dre Smith MD [facility] cough    In 8 months [facility] CT RM1 [facility] CT - [place] Per Dr Smith orders- lung nodules, thyroid          Recent Outpatient Visits              4 weeks ago Medicare annual wellness visit, subsequent    [facility], [address], [place] Lorna Baum MD    Office Visit    1 month ago Acute bronchitis, unspecified organism    [facility], [address], [place] Ashley Spear MD    Office Visit    1 year ago Pulmonary nodules    [facility] Dre Smith MD    Office Visit    1 year ago Pulmonary nodules    [facility], [address], [place] Lorna Baum MD    Office Visit    1 year ago Primary hypertension    [facility], [address], [place] Lorna Buam MD    Office Visit

## 2024-06-22 RX ORDER — ALBUTEROL SULFATE 90 UG/1
2 AEROSOL, METERED RESPIRATORY (INHALATION) EVERY 6 HOURS PRN
Qty: 8.5 G | Refills: 2 | Status: SHIPPED | OUTPATIENT
Start: 2024-06-22

## 2024-06-27 ENCOUNTER — HOSPITAL ENCOUNTER (OUTPATIENT)
Dept: CV DIAGNOSTICS | Facility: HOSPITAL | Age: 69
Discharge: HOME | End: 2024-06-27
Attending: INTERNAL MEDICINE
Payer: MEDICARE

## 2024-06-27 DIAGNOSIS — R06.02 SHORTNESS OF BREATH: ICD-10-CM

## 2024-06-27 PROCEDURE — 93306 TTE W/DOPPLER COMPLETE: CPT | Performed by: INTERNAL MEDICINE

## 2024-06-29 ENCOUNTER — HOSPITAL ENCOUNTER (OUTPATIENT)
Dept: MAMMOGRAPHY | Age: 69
Discharge: HOME | End: 2024-06-29
Attending: INTERNAL MEDICINE
Payer: MEDICARE

## 2024-06-29 ENCOUNTER — HOSPITAL ENCOUNTER (OUTPATIENT)
Dept: BONE DENSITY | Age: 69
Discharge: HOME | End: 2024-06-29
Attending: INTERNAL MEDICINE
Payer: MEDICARE

## 2024-06-29 DIAGNOSIS — Z12.31 SCREENING MAMMOGRAM FOR BREAST CANCER: ICD-10-CM

## 2024-06-29 DIAGNOSIS — M85.89 OSTEOPENIA OF MULTIPLE SITES: ICD-10-CM

## 2024-06-29 PROCEDURE — 77080 DXA BONE DENSITY AXIAL: CPT | Performed by: INTERNAL MEDICINE

## 2024-06-29 PROCEDURE — 77067 SCR MAMMO BI INCL CAD: CPT | Performed by: INTERNAL MEDICINE

## 2024-06-29 PROCEDURE — 77063 BREAST TOMOSYNTHESIS BI: CPT | Performed by: INTERNAL MEDICINE

## 2024-07-15 ENCOUNTER — OFFICE VISIT (OUTPATIENT)
Dept: PULMONOLOGY | Facility: CLINIC | Age: 69
End: 2024-07-15

## 2024-07-15 VITALS
DIASTOLIC BLOOD PRESSURE: 76 MMHG | SYSTOLIC BLOOD PRESSURE: 134 MMHG | HEIGHT: 63 IN | WEIGHT: 224 LBS | BODY MASS INDEX: 39.69 KG/M2 | OXYGEN SATURATION: 94 % | HEART RATE: 78 BPM | RESPIRATION RATE: 14 BRPM

## 2024-07-15 DIAGNOSIS — R91.8 PULMONARY NODULES: Primary | ICD-10-CM

## 2024-07-15 PROCEDURE — 99213 OFFICE O/P EST LOW 20 MIN: CPT | Performed by: INTERNAL MEDICINE

## 2024-07-15 NOTE — PROGRESS NOTES
The patient is a 69-year-old female who I know well from prior evaluation comes in now for follow-up.  The multiple pulmonary nodules have been stable on CT scan.  She has some mild dyspnea which is responding well to Breo.  PFTs suggested mild to moderate obstruction.    Review of Systems:  Vision normal. Ear nose and throat normal. Bowel normal. Bladder function normal. No depression. No thyroid disease. No lymphatic system concerns.  No rash. Muscles and joints unremarkable. No weight loss no weight gain.    Physical Examination:  Vital signs normal. HEENT examination is unremarkable with pupils equal round and reactive to light and accommodation. Neck without adenopathy, thyromegaly, JVD nor bruit. Lungs clear to auscultation and percussion. Cardiac regular rate and rhythm no murmur. Abdomen nontender, without hepatosplenomegaly and no mass appreciable. Extremities and Musculoskeletal without clubbing cyanosis nor edema, and mobility acceptable. Neurologic grossly intact with symmetric tone and strength and reflex.    Assessment and plan:  1.  Multiple pulmonary nodules-stable on serial CT.  Will repeat CT scan of the chest March 2025 and if the nodules are stable she will not need any further imaging.  Presumed benign.    2.  Obstruction on PFTs-responding to Breo.  May have mild asthma.  Continue current management, follow with gargle rinse and spit, see me again at the 1 year interval or sooner if needed and contact me promptly if new trouble.

## 2024-07-18 ENCOUNTER — OFFICE VISIT (OUTPATIENT)
Dept: AUDIOLOGY | Facility: CLINIC | Age: 69
End: 2024-07-18

## 2024-07-18 ENCOUNTER — OFFICE VISIT (OUTPATIENT)
Dept: OTOLARYNGOLOGY | Facility: CLINIC | Age: 69
End: 2024-07-18
Payer: MEDICARE

## 2024-07-18 VITALS — HEIGHT: 63 IN | BODY MASS INDEX: 39.69 KG/M2 | WEIGHT: 224 LBS

## 2024-07-18 DIAGNOSIS — E04.9 ENLARGED THYROID: ICD-10-CM

## 2024-07-18 DIAGNOSIS — H93.13 TINNITUS OF BOTH EARS: Primary | ICD-10-CM

## 2024-07-18 DIAGNOSIS — H61.21 CERUMEN DEBRIS ON TYMPANIC MEMBRANE OF RIGHT EAR: ICD-10-CM

## 2024-07-18 DIAGNOSIS — H90.3 SENSORINEURAL HEARING LOSS (SNHL) OF BOTH EARS: ICD-10-CM

## 2024-07-18 DIAGNOSIS — R05.3 CHRONIC COUGH: ICD-10-CM

## 2024-07-18 DIAGNOSIS — H65.02 ACUTE SEROUS OTITIS MEDIA OF LEFT EAR, RECURRENCE NOT SPECIFIED: ICD-10-CM

## 2024-07-18 PROCEDURE — 92557 COMPREHENSIVE HEARING TEST: CPT | Performed by: AUDIOLOGIST

## 2024-07-18 PROCEDURE — 31231 NASAL ENDOSCOPY DX: CPT | Performed by: SPECIALIST

## 2024-07-18 PROCEDURE — 92567 TYMPANOMETRY: CPT | Performed by: AUDIOLOGIST

## 2024-07-18 PROCEDURE — G0268 REMOVAL OF IMPACTED WAX MD: HCPCS | Performed by: SPECIALIST

## 2024-07-18 PROCEDURE — 99214 OFFICE O/P EST MOD 30 MIN: CPT | Performed by: SPECIALIST

## 2024-07-18 RX ORDER — CEFDINIR 300 MG/1
300 CAPSULE ORAL 2 TIMES DAILY
Qty: 20 CAPSULE | Refills: 0 | Status: SHIPPED | OUTPATIENT
Start: 2024-07-18

## 2024-07-19 NOTE — PROGRESS NOTES
Shayna Perez is a 69 year old female.   Chief Complaint   Patient presents with    Ear Problem     Ear drainage     HPI:   Patient here with multiple issues including bilateral tinnitus, chronic cough, enlarged thyroid.  There are also pulmonary nodules on her CT of the chest.    Current Outpatient Medications   Medication Sig Dispense Refill    cefdinir 300 MG Oral Cap Take 1 capsule (300 mg total) by mouth 2 (two) times daily. 20 capsule 0    albuterol 108 (90 Base) MCG/ACT Inhalation Aero Soln Inhale 2 puffs into the lungs every 6 (six) hours as needed for Wheezing. 8.5 g 2    Irbesartan 150 MG Oral Tab Take 1 tablet (150 mg total) by mouth nightly. 90 tablet 3    fluticasone furoate-vilanterol (BREO ELLIPTA) 200-25 MCG/ACT Inhalation Aerosol Powder, Breath Activated Inhale 1 puff into the lungs daily. 1 each 1    pantoprazole 40 MG Oral Tab EC Take 1 tablet (40 mg total) by mouth before breakfast. 90 tablet 3    Omega-3 Fatty Acids (OMEGA-3 FISH OIL) 1000 MG Oral Cap Take by mouth As Directed.      Multiple Vitamins-Minerals (ZINC OR) Take 1 tablet by mouth daily.      Cholecalciferol (D3 OR) Take 1 tablet by mouth daily.      IRON OR Take 1 tablet by mouth daily.      Ascorbic Acid (VITAMIN C OR) Take 2 tablets by mouth daily.      Melatonin 5 MG Oral Tab Take 2 tablets (10 mg total) by mouth as needed (as needed at night).      fluticasone-salmeterol 115-21 MCG/ACT Inhalation Aerosol Inhale 2 puffs into the lungs 2 (two) times daily. Gargle throat after use of inhaler 1 each 0    predniSONE 10 MG Oral Tab Take 20 mg daily in a.m. x 3 days, take 10 mg daily x 3 days 9 tablet 0    benzonatate 200 MG Oral Cap Take 1 capsule (200 mg total) by mouth 3 (three) times daily as needed for cough. 40 capsule 1    predniSONE 10 MG Oral Tab 4 tabs daily for 3 days, then 3 tabs daily for 3 days, then 2 tabs daily for 3 days, 1 tab daily for 3 days. 30 tablet 0      Past Medical History:    Asthma (HCC)    with colds  bronchial issues /uses inhaler occassionally    Bell's palsy    with pregnancy    Esophageal reflux    Fibroids    D&C 2000    History of blood transfusion    several yrs ago stomach ulcers    History of stomach ulcers    Osteoarthritis    Sciatica    Visual impairment    contacts /glasses      Social History:  Social History     Socioeconomic History    Marital status:    Tobacco Use    Smoking status: Former     Current packs/day: 0.25     Average packs/day: 0.3 packs/day for 10.0 years (2.5 ttl pk-yrs)     Types: Cigarettes     Passive exposure: Never    Smokeless tobacco: Never   Vaping Use    Vaping status: Never Used   Substance and Sexual Activity    Alcohol use: Yes     Alcohol/week: 1.0 standard drink of alcohol     Types: 1 Glasses of wine per week     Comment: occassional    Drug use: No   Other Topics Concern    Caffeine Concern No        REVIEW OF SYSTEMS:   GENERAL HEALTH: feels well otherwise  GENERAL : denies fever, chills, sweats, weight loss, weight gain  SKIN: denies any unusual skin lesions or rashes  RESPIRATORY: denies shortness of breath with exertion  NEURO: denies headaches    EXAM:   Ht 5' 3\" (1.6 m)   Wt 224 lb (101.6 kg)   BMI 39.68 kg/m²   System Details   Skin Inspection - Normal.   Constitutional Overall appearance - Normal.   Head/Face Facial features - Normal. Eyebrows - Normal. Skull - Normal.   Eyes Conjunctiva - Right: Normal, Left: Normal. Pupil - Right: Normal, Left: Normal.    Ears Inspection - Right: Normal, Left: Normal.   Ears = right cerumen occlussion.    Fully cleaned under microscope using instrumentation and suctioning.    Sclerotic tympanic membranes.  Difficult to evaluate left serous otitis media.     Nasal External nose - Normal.   Consent was obtained.  The nasal cavity was anesthetized with 1% neosynephrine and 4% lidocaine.  The bilateral nares were examined from the nasal vestibule to the nasopharynx.  Areas examined include the nasal floor, turbinates,  superior, middle, and inferior meatus, sphenoethmoid ethmoid recess, the nasal septum, eustation tube and nasopharynx.  All abnormalities are listed in the exam section.  Nasal congestion, clear postnasal drainage.  No purulence or polyps seen.     Oral/Oropharynx Lips - Normal, Tonsils - Normal, Tongue - Normal    Neck Exam Inspection - Normal. Palpation - Normal. Parotid gland - Normal. Thyroid gland - Normal.   Lymph Detail Submental. Submandibular. Anterior cervical. Posterior cervical. Supraclavicular.  All without enlargement   Psychiatric Orientation - Oriented to time, place, person & situation. Appropriate mood and affect.   Neurological Memory - Normal. Cranial nerves - Cranial nerves II through XII grossly intact.   Nasopharynx Normal by fiberoptic exam   Larynx Normal by fiberoptic exam     ASSESSMENT AND PLAN:   1. Tinnitus of both ears  Audiogram shows bilateral moderate high-frequency sensorineural hearing loss.  Patient to reduce sodium and caffeine for the tinnitus.    - Audiology Referral - [place] ([facility])    2. Enlarged thyroid  Not palpable on physical examination however seen on prior CT scan  - US THYROID (CPT=76536); Future    3. Chronic cough  Patient with presumed asthma.  CT scan reviewed from 3/5/2024 which showed pulmonary nodules.    4. Acute serous otitis media of left ear, recurrence not specified  Flat tympanogram.  Patient placed on a trial of Omnicef.  Difficult to evaluate due to dense tympanosclerosis.  Follow-up in 3 weeks time, sooner if problems.    5. Cerumen debris on tympanic membrane of right ear  Fully cleaned.  - Removal Impacted Cerumen Requiring Instrumentation, Unilateral      The patient indicates understanding of these issues and agrees to the plan.      Marie Smith MD  7/18/2024  10:32 PM

## 2024-07-19 NOTE — PATIENT INSTRUCTIONS
Your audiogram showed bilateral high-frequency moderate sensorineural hearing loss.  There was however a flat tympanogram consistent with fluid on the left-hand side.  Ruman was fully cleaned from your right ear.    You were placed on a trial of Omnicef.  Fiberoptic scope showed congestion but no other purulence or polyps.  A thyroid ultrasound was ordered for your enlarged thyroid gland.

## 2024-08-01 ENCOUNTER — HOSPITAL ENCOUNTER (OUTPATIENT)
Dept: ULTRASOUND IMAGING | Age: 69
Discharge: HOME | End: 2024-08-01
Attending: SPECIALIST
Payer: MEDICARE

## 2024-08-01 DIAGNOSIS — E04.9 ENLARGED THYROID: ICD-10-CM

## 2024-08-01 PROCEDURE — 76536 US EXAM OF HEAD AND NECK: CPT | Performed by: SPECIALIST

## 2024-08-19 ENCOUNTER — HOSPITAL ENCOUNTER (OUTPATIENT)
Age: 69
Discharge: HOME | End: 2024-08-19
Payer: MEDICARE

## 2024-08-19 VITALS
TEMPERATURE: 98 F | DIASTOLIC BLOOD PRESSURE: 61 MMHG | OXYGEN SATURATION: 97 % | RESPIRATION RATE: 18 BRPM | SYSTOLIC BLOOD PRESSURE: 154 MMHG | HEART RATE: 80 BPM

## 2024-08-19 DIAGNOSIS — N30.01 ACUTE CYSTITIS WITH HEMATURIA: Primary | ICD-10-CM

## 2024-08-19 LAB
BILIRUB UR QL STRIP: NEGATIVE
COLOR UR: YELLOW
GLUCOSE UR STRIP-MCNC: NEGATIVE MG/DL
KETONES UR STRIP-MCNC: NEGATIVE MG/DL
NITRITE UR QL STRIP: NEGATIVE
PH UR STRIP: 6 [PH]
PROT UR STRIP-MCNC: 100 MG/DL
SP GR UR STRIP: >=1.03
UROBILINOGEN UR STRIP-ACNC: <2 MG/DL

## 2024-08-19 PROCEDURE — 81002 URINALYSIS NONAUTO W/O SCOPE: CPT

## 2024-08-19 PROCEDURE — 87186 SC STD MICRODIL/AGAR DIL: CPT | Performed by: PHYSICIAN ASSISTANT

## 2024-08-19 PROCEDURE — 87086 URINE CULTURE/COLONY COUNT: CPT | Performed by: PHYSICIAN ASSISTANT

## 2024-08-19 PROCEDURE — 99214 OFFICE O/P EST MOD 30 MIN: CPT

## 2024-08-19 PROCEDURE — 87088 URINE BACTERIA CULTURE: CPT | Performed by: PHYSICIAN ASSISTANT

## 2024-08-19 PROCEDURE — 99213 OFFICE O/P EST LOW 20 MIN: CPT

## 2024-08-19 RX ORDER — NITROFURANTOIN 25; 75 MG/1; MG/1
100 CAPSULE ORAL 2 TIMES DAILY
Qty: 14 CAPSULE | Refills: 0 | Status: SHIPPED | OUTPATIENT
Start: 2024-08-19 | End: 2024-08-26

## 2024-08-19 NOTE — ED PROVIDER NOTES
Patient Seen in: [facility] [place]      History     Chief Complaint   Patient presents with    Urinary Symptoms            Stated Complaint: Uti    Subjective:   HPI    Patient is a 69-year-old female, presenting to immediate care for evaluation of UTI symptoms.  Onset: 4 days.  Associated urinary frequency, urgency, and dysuria.  Taking Azo for symptoms.  Concern for UTI.  Symptoms feel similar.  Past UTI January 2024 when was treated with oral antibiotics (Macrobid) with resolution of symptoms.  She denies any fevers or chills or myalgias.  No nausea or vomiting.  No back, flank, abdominal, pelvic pain.  No gross hematuria.  No vaginal bleeding.      Objective:   Past Medical History:    Asthma ([facility])    with colds bronchial issues /uses inhaler occassionally    Bell's palsy    with pregnancy    Esophageal reflux    Fibroids    D&C 2000    History of blood transfusion    several yrs ago stomach ulcers    History of stomach ulcers    Osteoarthritis    Sciatica    Visual impairment    contacts /glasses              Past Surgical History:   Procedure Laterality Date    Back surgery  01/08/2018    Laminectomy    D & c  2000    Knee arthroscopy Left 01/11/2018    left knee arthroscopy    Other surgical history  1989    \"cyst removed from wrist\" per NG    Spine surgery procedure unlisted      Fusion L4-5    T&a  1963                No pertinent social history.            Review of Systems   Constitutional:  Negative for chills and fever.   Gastrointestinal:  Negative for abdominal pain, nausea and vomiting.   Genitourinary:  Positive for dysuria, frequency and urgency. Negative for difficulty urinating, flank pain, hematuria, pelvic pain and vaginal bleeding.   Musculoskeletal:  Negative for back pain.   Psychiatric/Behavioral:  Negative for confusion.        Positive for stated Chief Complaint: Urinary Symptoms (/)    Other systems are as noted in HPI.  Constitutional and vital signs reviewed.      All other  systems reviewed and negative except as noted above.    Physical Exam     ED Triage Vitals [08/19/24 1009]   /61   Pulse 80   Resp 18   Temp 97.8 °F (36.6 °C)   Temp src Temporal   SpO2 97 %   O2 Device None (Room air)       Current Vitals:   Vital Signs  BP: 154/61  Pulse: 80  Resp: 18  Temp: 97.8 °F (36.6 °C)  Temp src: Temporal    Oxygen Therapy  SpO2: 97 %  O2 Device: None (Room air)            Physical Exam  Vitals and nursing note reviewed.   Constitutional:       General: She is not in acute distress.     Appearance: She is well-developed. She is not ill-appearing, toxic-appearing or diaphoretic.   HENT:      Head: Normocephalic and atraumatic.      Mouth/Throat:      Mouth: Mucous membranes are moist.   Eyes:      General: No scleral icterus.  Cardiovascular:      Rate and Rhythm: Normal rate.   Pulmonary:      Effort: No respiratory distress.   Abdominal:      General: There is no distension.      Palpations: Abdomen is soft.      Tenderness: There is no abdominal tenderness.   Musculoskeletal:         General: Normal range of motion.   Neurological:      General: No focal deficit present.      Mental Status: She is alert.      Gait: Gait normal.   Psychiatric:         Mood and Affect: Mood normal.         Behavior: Behavior normal.           ED Course     Labs Reviewed   [facility] POCT URINALYSIS DIPSTICK - Abnormal; Notable for the following components:       Result Value    Urine Clarity Cloudy (*)     Protein urine 100 (*)     Blood, Urine Large (*)     Leukocyte esterase urine Small (*)     All other components within normal limits   URINE CULTURE, ROUTINE     Results for orders placed or performed during the hospital encounter of 08/19/24   POCT Urinalysis Dipstick    Collection Time: 08/19/24 10:15 AM   Result Value Ref Range    Urine Color Yellow Yellow    Urine Clarity Cloudy (A) Clear    Specific Gravity, Urine >=1.030 1.005 - 1.030    PH, Urine 6.0 5.0 - 8.0    Protein urine 100 (A) Negative  mg/dL    Glucose, Urine Negative Negative mg/dL    Ketone, Urine Negative Negative mg/dL    Bilirubin, Urine Negative Negative    Blood, Urine Large (A) Negative    Nitrite Urine Negative Negative    Urobilinogen urine <2.0 <2.0 mg/dL    Leukocyte esterase urine Small (A) Negative                MDM       Dx: Acute cystitis with hematuria, initial encounter  Symptomatic  No systemic symptoms  Overall well appearing  Afebrile  Urine Dip suggestive of UTI  No clinical signs of obstructing nephrolithiasis or pyelonephritis  Will treat for acute uncomplicated cystitis  Rx Macrobid BID for 5 days for UTI  Urine culture sent, pending  PCP follow-up   Discharge instruction on bladder infection (female)  Patient understood and agrees to treatment plan  ED Return precautions  Stable for discharge         Medical Decision Making      Disposition and Plan     Clinical Impression:  1. Acute cystitis with hematuria         Disposition:  Discharge  8/19/2024 10:23 am    Follow-up:  Lorna Baum MD  [address] [place] [address] [phone]                Medications Prescribed:  Discharge Medication List as of 8/19/2024 10:24 AM        START taking these medications    Details   nitrofurantoin monohydrate macro 100 MG Oral Cap Take 1 capsule (100 mg total) by mouth 2 (two) times daily for 7 days., Normal, Disp-14 capsule, R-0

## 2024-08-27 RX ORDER — PANTOPRAZOLE SODIUM 40 MG/1
40 TABLET, DELAYED RELEASE ORAL
Qty: 90 TABLET | Refills: 3 | Status: SHIPPED | OUTPATIENT
Start: 2024-08-27

## 2024-08-27 NOTE — TELEPHONE ENCOUNTER
Refill passed per [facility] protocol.  Requested Prescriptions   Pending Prescriptions Disp Refills    pantoprazole 40 MG Oral Tab EC 90 tablet 3     Sig: Take 1 tablet (40 mg total) by mouth before breakfast.       Gastrointestional Medication Protocol Passed - 8/26/2024  8:45 AM        Passed - In person appointment or virtual visit in the past 12 mos or appointment in next 3 mos     Recent Outpatient Visits              1 month ago Tinnitus of both ears    [facility]    Office Visit    1 month ago [facility]    [facility]    1 month ago Pulmonary nodules    [facility]    Office Visit    3 months ago Medicare annual wellness visit, subsequent    [facility]    Office Visit    3 months ago Acute bronchitis, unspecified organism    [facility] [address], [place] Ashley Spear MD    Office Visit          Future Appointments         Provider Department Appt Notes    In 6 months [facility] CT RM1 [facility] CT - [place] Per Dr Smith orders- lung nodules, thyroid    In 6 months Dre Smith MD [facility] follow up (CT)                       Recent Outpatient Visits              1 month ago Tinnitus of both ears    [facility] CRISTIAN    Office Visit    1 month ago [facility] both [facility]    [facility] Visit    1 month ago Pulmonary nodules    [facility] Dre Smith MD    Office Visit    3 months ago Medicare annual wellness visit, subsequent    [facility]  [address], [place] Lorna Baum MD    Office Visit    3 months ago Acute bronchitis, unspecified organism    [facility] [address], [place] Ashley Spear MD    Office Visit          Future Appointments         Provider Department Appt Notes    In 6 months [facility] CT [address] - [place] Per Dr Smith orders- lung nodules, thyroid    In 6 months Dre Smith MD [facility] follow up (CT)

## 2025-02-02 ENCOUNTER — APPOINTMENT (OUTPATIENT)
Dept: CT IMAGING | Age: 70
End: 2025-02-02
Payer: MEDICARE

## 2025-02-02 ENCOUNTER — APPOINTMENT (OUTPATIENT)
Dept: GENERAL RADIOLOGY | Age: 70
End: 2025-02-02
Payer: MEDICARE

## 2025-02-02 ENCOUNTER — HOSPITAL ENCOUNTER (OUTPATIENT)
Age: 70
Discharge: HOME | End: 2025-02-02
Payer: MEDICARE

## 2025-02-02 VITALS
RESPIRATION RATE: 18 BRPM | SYSTOLIC BLOOD PRESSURE: 139 MMHG | HEART RATE: 93 BPM | DIASTOLIC BLOOD PRESSURE: 55 MMHG | TEMPERATURE: 97 F | OXYGEN SATURATION: 95 %

## 2025-02-02 DIAGNOSIS — J01.90 ACUTE NON-RECURRENT SINUSITIS, UNSPECIFIED LOCATION: Primary | ICD-10-CM

## 2025-02-02 LAB
#MXD IC: 1 X10ˆ3/UL (ref 0.1–1)
BUN BLD-MCNC: 20 MG/DL (ref 7–18)
CHLORIDE BLD-SCNC: 102 MMOL/L (ref 98–112)
CO2 BLD-SCNC: 30 MMOL/L (ref 21–32)
CREAT BLD-MCNC: 0.9 MG/DL
DDIMER WHOLE BLOOD: 455 NG/ML DDU (ref ?–400)
EGFRCR SERPLBLD CKD-EPI 2021: 70 ML/MIN/1.73M2 (ref 60–?)
GLUCOSE BLD-MCNC: 111 MG/DL (ref 70–99)
HCT VFR BLD AUTO: 44.6 %
HCT VFR BLD CALC: 45 %
HGB BLD-MCNC: 14.4 G/DL
ISTAT IONIZED CALCIUM FOR CHEM 8: 1.11 MMOL/L (ref 1.12–1.32)
LYMPHOCYTES # BLD AUTO: 2.7 X10ˆ3/UL (ref 1–4)
LYMPHOCYTES NFR BLD AUTO: 29 %
MCH RBC QN AUTO: 29.4 PG (ref 26–34)
MCHC RBC AUTO-ENTMCNC: 32.3 G/DL (ref 31–37)
MCV RBC AUTO: 91.2 FL (ref 80–100)
MIXED CELL %: 11 %
NEUTROPHILS # BLD AUTO: 5.6 X10ˆ3/UL (ref 1.5–7.7)
NEUTROPHILS NFR BLD AUTO: 60 %
PLATELET # BLD AUTO: 307 X10ˆ3/UL (ref 150–450)
POTASSIUM BLD-SCNC: 4.3 MMOL/L (ref 3.6–5.1)
RBC # BLD AUTO: 4.89 X10ˆ6/UL
SODIUM BLD-SCNC: 142 MMOL/L (ref 136–145)
WBC # BLD AUTO: 9.3 X10ˆ3/UL (ref 4–11)

## 2025-02-02 PROCEDURE — 80047 BASIC METABLC PNL IONIZED CA: CPT

## 2025-02-02 PROCEDURE — 99215 OFFICE O/P EST HI 40 MIN: CPT

## 2025-02-02 PROCEDURE — 99214 OFFICE O/P EST MOD 30 MIN: CPT

## 2025-02-02 PROCEDURE — 36415 COLL VENOUS BLD VENIPUNCTURE: CPT

## 2025-02-02 PROCEDURE — 85025 COMPLETE CBC W/AUTO DIFF WBC: CPT

## 2025-02-02 PROCEDURE — 85378 FIBRIN DEGRADE SEMIQUANT: CPT

## 2025-02-02 PROCEDURE — 71046 X-RAY EXAM CHEST 2 VIEWS: CPT

## 2025-02-02 PROCEDURE — 71260 CT THORAX DX C+: CPT

## 2025-02-02 RX ORDER — BENZONATATE 100 MG/1
200 CAPSULE ORAL 3 TIMES DAILY PRN
Qty: 30 CAPSULE | Refills: 0 | Status: SHIPPED | OUTPATIENT
Start: 2025-02-02 | End: 2025-03-04

## 2025-02-02 NOTE — ED PROVIDER NOTES
Patient Seen in: [facility] [place]      History     Chief Complaint   Patient presents with    Cough/URI    Sore Throat     Stated Complaint: sore throat; cough  Subjective:   Shayna is a 69-year-old female presenting to the immediate care complaining of cough, congestion, rhinorrhea, sinus pressure, postnasal drip that started a little over a week ago.  Patient has a history of asthma and has been using her inhaler every once in a while.  No significant increased inhaler use.  Patient states that for the past 2 days she has been coughing up blood when she coughs very hard so she came in for evaluation today.  She denies any chest pain, shortness of breath, fever, abdominal pain or vomiting.  She states that she feels like this is more her sinuses however the cough has been persistent and she was concerned about the blood.  Patient is eating and drinking well and is well-hydrated.  She denies any other concerns or complaints.        Objective:   Past Medical History:    Asthma (HCC)    with colds bronchial issues /uses inhaler occassionally    Bell's palsy    with pregnancy    Esophageal reflux    Fibroids    D&C 2000    History of blood transfusion    several yrs ago stomach ulcers    History of stomach ulcers    Osteoarthritis    Sciatica    Visual impairment    contacts /glasses            Past Surgical History:   Procedure Laterality Date    Back surgery  01/08/2018    Laminectomy    D & c  2000    Knee arthroscopy Left 01/11/2018    left knee arthroscopy    Other surgical history  1989    \"cyst removed from wrist\" per     Spine surgery procedure unlisted      Fusion L4-5    T&a  1963              Social History     Socioeconomic History    Marital status:    Tobacco Use    Smoking status: Former     Current packs/day: 0.25     Average packs/day: 0.3 packs/day for 10.0 years (2.5 ttl pk-yrs)     Types: Cigarettes     Passive exposure: Never    Smokeless tobacco: Never   Vaping Use    Vaping  status: Never Used   Substance and Sexual Activity    Alcohol use: Yes     Alcohol/week: 1.0 standard drink of alcohol     Types: 1 Glasses of wine per week     Comment: occassional    Drug use: No   Other Topics Concern    Caffeine Concern No            Review of Systems    Positive for stated complaint: Cough/URI and Sore Throat    Other systems are as noted in HPI.  Constitutional and vital signs reviewed.      All other systems reviewed and negative except as noted above.    Physical Exam     ED Triage Vitals [02/02/25 1412]   /58   Pulse 95   Resp 20   Temp 97.1 °F (36.2 °C)   Temp src Oral   SpO2 96 %   O2 Device None (Room air)     Current:/55   Pulse 93   Temp 97.1 °F (36.2 °C) (Oral)   Resp 18   SpO2 95%     Physical Exam  Vitals and nursing note reviewed.   Constitutional:       General: She is not in acute distress.     Appearance: Normal appearance. She is not ill-appearing, toxic-appearing or diaphoretic.   HENT:      Head: Normocephalic.      Right Ear: Ear canal and external ear normal. A middle ear effusion is present.      Left Ear: Ear canal and external ear normal. A middle ear effusion is present.      Nose: Congestion and rhinorrhea present.      Mouth/Throat:      Mouth: Mucous membranes are moist. No oral lesions.      Pharynx: Oropharynx is clear. Uvula midline. No pharyngeal swelling, oropharyngeal exudate, posterior oropharyngeal erythema, uvula swelling or postnasal drip.      Tonsils: No tonsillar exudate or tonsillar abscesses. 0 on the right. 0 on the left.   Eyes:      Conjunctiva/sclera: Conjunctivae normal.   Cardiovascular:      Rate and Rhythm: Normal rate and regular rhythm.      Pulses: Normal pulses.      Heart sounds: Normal heart sounds.   Pulmonary:      Effort: Pulmonary effort is normal. No tachypnea, bradypnea, accessory muscle usage, prolonged expiration, respiratory distress or retractions.      Breath sounds: Normal breath sounds and air entry. No  stridor, decreased air movement or transmitted upper airway sounds. No decreased breath sounds, wheezing, rhonchi or rales.   Abdominal:      General: Abdomen is flat.   Musculoskeletal:         General: Normal range of motion.      Cervical back: Normal range of motion.   Skin:     General: Skin is warm.      Capillary Refill: Capillary refill takes less than 2 seconds.   Neurological:      General: No focal deficit present.      Mental Status: She is alert and oriented to person, place, and time.   Psychiatric:         Mood and Affect: Mood normal.         Behavior: Behavior normal.         Thought Content: Thought content normal.         Judgment: Judgment normal.         ED Course   Radiology:    CT CHEST PE AORTA (IV ONLY) (CPT=71260)   Final Result   PROCEDURE: CT CHEST PE AORTA (IV ONLY) (CPT=71260)       COMPARISON: [facility] [place] [facility], CT CHEST(CONTRAST    ONLY) (CPT=71260), 4/21/2023, 8:26 AM.  [facility] [place] [facility], CT CHEST(CONTRAST ONLY) (CPT=71260), 3/05/2024, 12:46 PM.       INDICATIONS: sore throat; cough, shortness of breath.       TECHNIQUE: CT images of the chest were obtained with non-ionic intravenous    contrast material.  Automated exposure control for dose reduction was    used. Adjustment of the mA and/or kV was done based on the patient's size.    Use of iterative reconstruction    technique for dose reduction was used. Dose information is transmitted to    the ACR (American College of Radiology) NRDR (National Radiology Data    Registry) which includes the Dose Index Registry.       FINDINGS:    CARDIAC: Mild coronary artery calcification.   MEDIASTINUM/BRI: No mass or adenopathy.     PULMONARY ARTERIES: Normal. No evidence for pulmonary embolus through    proximal subsegmental level..     AORTA: Atherosclerotic calcification.  No aneurysm or dissection.   LUNGS/PLEURA: Multiple bilateral pulmonary nodules with largest as follows    on  series 4:  Right lower lobe 13 x 12 mm image 100, right lower lobe 11 x    10 mm image 103 left lower lobe 8 x 8 mm image 74, left lower lobe 6 x 6    mm image 97 , left lower lobe 6    x 5 mm image 99, right middle lobe 9 x 7 mm image 95 mm, right middle    lobe 7 x 6 mm image 98, 7 x 5 mm left lower lobe image 100 in 21.     Subsegmental atelectasis and or scarring in inferior lingula and right    middle lobe.  No unequivocal new pulmonary    nodule.  No consolidation or pleural effusion.  Azygos fissure.   CHEST WALL: Normal.  No mass or axillary adenopathy.     LIMITED ABDOMEN: Generalized decrease in hepatic attenuation.     BONES: No suspect bone lesion or fracture.       OTHER: Negative.                     =====   CONCLUSION:    1. No pulmonary embolus.   2. Multiple bilateral pulmonary nodules without a significant change.  If    patient has a history of smoking, would consider a follow-up chest CT in    1-2 years.     3. Hepatic steatosis.               Dictated by (CST): Masood Driver MD on 2/02/2025 at 4:01 PM        Finalized by (CST): Masood Driver MD on 2/02/2025 at 4:13 PM               XR CHEST PA + LAT CHEST (CPT=71046)   Final Result   PROCEDURE: XR CHEST PA + LAT CHEST (CPT=71046)       COMPARISON: [facility] [place] [facility], XR CHEST PA + LAT    CHEST (CPT=71046), 5/24/2024, 2:14 PM.       INDICATIONS: Sore throat, cough x 1 week.       TECHNIQUE:   Two views.         FINDINGS:    CARDIAC/VASC: Heart size and pulmonary vascularity normal. Atherosclerotic    calcification aorta.    MEDIAST/BRI:   No visible mass or adenopathy.   LUNGS/PLEURA: Azygos fissure.  No consolidation or pleural effusion.   BONES: No fracture or visible bony lesion. Degenerative changes in spine.   OTHER: Negative.                     =====   CONCLUSION:    1. No acute cardiopulmonary finding.               Dictated by (CST): Masood Driver MD on 2/02/2025 at 3:05 PM        Finalized by (CST): [place]  Masood SULLIVAN MD on 2/02/2025 at 3:06 PM                 Labs Reviewed   D-DIMER (POC) - Abnormal; Notable for the following components:       Result Value    D-Dimer  (*)     All other components within normal limits   POCT ISTAT CHEM8 CARTRIDGE - Abnormal; Notable for the following components:    ISTAT BUN 20 (*)     ISTAT Ionized Calcium 1.11 (*)     ISTAT Glucose 111 (*)     All other components within normal limits   POCT CBC       MDM     Medical Decision Making  Differential diagnoses reflecting the complexity of care include but are not limited to pneumonia, sinusitis, PE, URI.    Comorbidities that add complexity to management include: Asthma, pulmonary nodules  History obtained by an independent source was from: Patient  My independent interpretations of studies include: X-ray  Patient is well appearing, non-toxic and in no acute distress.  Vital signs are stable.     69-year-old female with history of asthma and pulmonary nodules presents to the immediate care with URI symptoms for the past week.  Patient states that for the past 2 days she has been coughing up some blood when she coughs hard.  Labs and a D-dimer were checked.  D-dimer was very mildly elevated.  CT scan of the chest was done that redemonstrated pulmonary nodules that are stable from previous CT.  There is no PE on the CT scan.  Recommended that patient make an appointment to follow-up with her pulmonologist.    Patient also endorses that her sinus symptoms have worsened over the past 2 to 3 days.  History and physical exam are consistent with sinusitis. Sent a prescription for Augmentin to treat sinusitis.  Also sent a prescription for Tessalon Perles for the cough.  Recommended that patient drink plenty of fluids, use Tylenol and Motrin for pain or fever, use Flonase and Mucinex for nasal congestion and may use over-the-counter medications for congestion as needed.  Also recommended using saline rinses/Sheldon pot for nasal congestion.   Recommended that if the patient develops any chest pain, respiratory complaints, severe headache, fever that does not improve with medications or any other concerning complaints they should go to the emergency department.      ED precautions discussed.  Patient (guardian) advised to follow up with PCP in 2-3 days.  Patient (guardian) agrees with this plan of care.  Patient (guardian) verbalizes understanding of discharge instructions and plan of care.  Patient discussed with Dr. Liao who agrees with this plan.      Amount and/or Complexity of Data Reviewed  Labs: ordered. Decision-making details documented in ED Course.  Radiology: ordered and independent interpretation performed. Decision-making details documented in ED Course.    Risk  OTC drugs.  Prescription drug management.        Disposition and Plan     Clinical Impression:  1. Acute non-recurrent sinusitis, unspecified location         Disposition:  Discharge  2/2/2025  4:15 pm    Follow-up:  Lorna Baum MD  [address] [place] [address]  [phone]                Medications Prescribed:  Discharge Medication List as of 2/2/2025  4:16 PM        START taking these medications    Details   amoxicillin clavulanate 875-125 MG Oral Tab Take 1 tablet by mouth 2 (two) times daily for 10 days., Normal, Disp-20 tablet, R-0      !! benzonatate 100 MG Oral Cap Take 2 capsules (200 mg total) by mouth 3 (three) times daily as needed for cough., Normal, Disp-30 capsule, R-0       !! - Potential duplicate medications found. Please discuss with provider.

## 2025-02-02 NOTE — DISCHARGE INSTRUCTIONS
The CT scan of your chest shows the chronic pulmonary nodules that are unchanged from your previous CT.  There is no blood clot in your lungs.    Please take the antibiotics as prescribed for sinusitis.    I also sent you a prescription for Tessalon Perles for your cough.  Please also use Flonase and Mucinex for nasal congestion.   May also use over the counter decongestants.  You can also try using a Herscher pot/saline rinses for congestion.  Use Tylenol or Motrin for pain or fever.    If you develop any shortness of breath, chest pain, severe headache, fever that does not resolve with medications or any other worsening or concerning symptoms you should go to the emergency department.  Otherwise follow-up with your primary care doctor.

## 2025-02-25 ENCOUNTER — TELEPHONE (OUTPATIENT)
Dept: CASE MANAGEMENT | Age: 70
End: 2025-02-25

## 2025-02-25 NOTE — TELEPHONE ENCOUNTER
Patient had CT chest on [facility]. The Prior one was done 3/05/2024.   Now Patient has another CT Chest scheduled for 3/5/2025.   Does she warrant having this Ct Chest so soon?     Please advise       Thank You

## 2025-06-17 ENCOUNTER — HOSPITAL ENCOUNTER (OUTPATIENT)
Age: 70
Discharge: HOME | End: 2025-06-17
Attending: EMERGENCY MEDICINE
Payer: MEDICARE

## 2025-06-17 VITALS
OXYGEN SATURATION: 94 % | SYSTOLIC BLOOD PRESSURE: 144 MMHG | HEART RATE: 81 BPM | DIASTOLIC BLOOD PRESSURE: 54 MMHG | RESPIRATION RATE: 18 BRPM | TEMPERATURE: 98 F

## 2025-06-17 DIAGNOSIS — J02.9 ACUTE VIRAL PHARYNGITIS: Primary | ICD-10-CM

## 2025-06-17 LAB — S PYO AG THROAT QL IA.RAPID: NEGATIVE

## 2025-06-17 PROCEDURE — 87651 STREP A DNA AMP PROBE: CPT | Performed by: EMERGENCY MEDICINE

## 2025-06-17 PROCEDURE — 99213 OFFICE O/P EST LOW 20 MIN: CPT

## 2025-06-17 PROCEDURE — 99212 OFFICE O/P EST SF 10 MIN: CPT

## 2025-06-17 NOTE — ED INITIAL ASSESSMENT (HPI)
Patient arrived ambulatory to room c/o a sore throat that started 2 days ago. No nasal congestion. No cough. No fevers. No n/v/d. Easy non labored respirations. No distress.

## 2025-06-17 NOTE — ED PROVIDER NOTES
Patient Seen in: [facility] [place]        History  Chief Complaint   Patient presents with    Sore Throat     Stated Complaint: Sore throat, ear/neck pain, cough    Subjective:   HPI    Patient is a 69-year-old female with past history of stomach ulcers, asthma, chronic left ear fluid who presents now with sore throat.  Patient states the symptoms began 2 days ago.  The patient denies any fever.  Patient denies any cough or bodyaches.  The patient did do a home COVID test which she reports was negative.  Patient states his sore throat is worse on the left than the right and describes some left anterior neck tenderness.      Objective:     Past Medical History:    Asthma (HCC)    with colds bronchial issues /uses inhaler occassionally    Bell's palsy    with pregnancy    Esophageal reflux    Fibroids    D&C 2000    History of blood transfusion    several yrs ago stomach ulcers    History of stomach ulcers    Osteoarthritis    Sciatica    Visual impairment    contacts /glasses              Past Surgical History:   Procedure Laterality Date    Back surgery  01/08/2018    Laminectomy    D & c  2000    Knee arthroscopy Left 01/11/2018    left knee arthroscopy    Other surgical history  1989    \"cyst removed from wrist\" per     Spine surgery procedure unlisted      Fusion L4-5    T&a  1963                Social History     Socioeconomic History    Marital status:    Tobacco Use    Smoking status: Former     Current packs/day: 0.25     Average packs/day: 0.3 packs/day for 10.0 years (2.5 ttl pk-yrs)     Types: Cigarettes     Passive exposure: Never    Smokeless tobacco: Never   Vaping Use    Vaping status: Never Used   Substance and Sexual Activity    Alcohol use: Yes     Alcohol/week: 1.0 standard drink of alcohol     Types: 1 Glasses of wine per week     Comment: occassional    Drug use: No   Other Topics Concern    Caffeine Concern No              Review of Systems    Positive for stated complaint: Sore  throat, ear/neck pain, cough  Other systems are as noted in HPI.  Constitutional and vital signs reviewed.      All other systems reviewed and negative except as noted above.                  Physical Exam    ED Triage Vitals [06/17/25 0908]   /54   Pulse 81   Resp 18   Temp 98.1 °F (36.7 °C)   Temp src Oral   SpO2 94 %   O2 Device None (Room air)       Current Vitals:   Vital Signs  BP: 144/54  Pulse: 81  Resp: 18  Temp: 98.1 °F (36.7 °C)  Temp src: Oral    Oxygen Therapy  SpO2: 94 %  O2 Device: None (Room air)            Physical Exam    Constitutional: Well-developed well-nourished in no acute distress  Head: Normocephalic, no swelling or tenderness  Neck: Minimal tenderness of the left anterior neck without any palpable masses  Eyes: Nonicteric sclera, no conjunctival injection  ENT: Mild fluid posterior to the left TM.  There is minimal posterior pharyngeal erythema; there is no asymmetric swelling of the tonsillar area.  Chest: Clear to auscultation, no tenderness  Cardiovascular: Regular rate and rhythm without murmur  Abdomen: Soft, nontender and nondistended  Neurologic: Patient is awake, alert and oriented ×3.  The patient's motor strength is 5 out of 5 and symmetric in the upper and lower extremities bilaterally  Extremities: No focal swelling or tenderness  Skin: No pallor, no redness or warmth to the touch          ED Course  Labs Reviewed   RAPID STREP A - Normal          Patient's negative strep was reviewed with her.  Probable viral etiology of the patient's symptoms.  No significant asymmetry on exam.  Recommend Motrin/Tylenol for pain or fever, follow-up for any worsening symptoms                  MDM     Viral pharyngitis versus strep throat versus peritonsillar abscess        Medical Decision Making      Disposition and Plan     Clinical Impression:  1. Acute viral pharyngitis         Disposition:  Discharge  6/17/2025  9:28 am    Follow-up:  Lorna Baum MD  [address] [place]  [address]  [phone]      As needed          Medications Prescribed:  Current Discharge Medication List                Supplementary Documentation:

## 2025-07-15 ENCOUNTER — TELEPHONE (OUTPATIENT)
Facility: CLINIC | Age: 70
End: 2025-07-15

## 2025-07-15 NOTE — TELEPHONE ENCOUNTER
----- Message from Evelyn AVENDANO sent at 8/31/2015  2:21 PM CDT -----  Regarding: Recall Colon  Recall colon in 10 years per ES. Colon done 8/14/15

## 2025-08-22 ENCOUNTER — HOSPITAL ENCOUNTER (OUTPATIENT)
Age: 70
Discharge: HOME | End: 2025-08-22
Attending: STUDENT IN AN ORGANIZED HEALTH CARE EDUCATION/TRAINING PROGRAM

## 2025-08-22 VITALS
TEMPERATURE: 98 F | RESPIRATION RATE: 16 BRPM | HEART RATE: 75 BPM | SYSTOLIC BLOOD PRESSURE: 151 MMHG | DIASTOLIC BLOOD PRESSURE: 75 MMHG | OXYGEN SATURATION: 97 %

## 2025-08-22 DIAGNOSIS — U07.1 COVID: Primary | ICD-10-CM

## 2025-08-22 DIAGNOSIS — J06.9 VIRAL URI: ICD-10-CM

## 2025-08-22 DIAGNOSIS — R09.81 NASAL CONGESTION: ICD-10-CM

## 2025-08-22 LAB — SARS-COV-2 RNA RESP QL NAA+PROBE: DETECTED

## 2025-08-22 PROCEDURE — 99213 OFFICE O/P EST LOW 20 MIN: CPT

## 2025-08-22 PROCEDURE — 99212 OFFICE O/P EST SF 10 MIN: CPT

## (undated) DEVICE — AMBIENT SUPER MULTIVAC 50 WITH                                    INTEGRATED FINGER SWITCHES IFS: Brand: COBLATION

## (undated) DEVICE — ARTHROSCOPY: Brand: MEDLINE INDUSTRIES, INC.

## (undated) DEVICE — OCCLUSIVE GAUZE STRIP,3% BISMUTH TRIBROMOPHENATE IN PETROLATUM BLEND: Brand: XEROFORM

## (undated) DEVICE — PADDING 4YDX6IN CTTN STRL WBRL

## (undated) DEVICE — TRAY SKIN PREP PVP-1

## (undated) DEVICE — GAMMEX® NON-LATEX PI ORTHO SIZE 9, STERILE POLYISOPRENE POWDER-FREE SURGICAL GLOVE: Brand: GAMMEX

## (undated) DEVICE — BANDAGE ROLL,100% COTTON, 6 PLY, LARGE: Brand: KERLIX

## (undated) DEVICE — GAUZE SPONGES,12 PLY: Brand: CURITY

## (undated) DEVICE — SOLUTION SURG DURA PREP HAZMAT

## (undated) DEVICE — BLADE SHVR COOLCUT 13CM 4MM

## (undated) DEVICE — 3 ML SYRINGE LUER-LOCK TIP: Brand: MONOJECT

## (undated) DEVICE — COTTON UNDERCAST PADDING,REGULAR FINISH: Brand: WEBRIL

## (undated) DEVICE — BANDAGE FLXMSTR 11YDX6IN STRL

## (undated) DEVICE — TUBING IRR 16FT CNT WV 3 ASCP

## (undated) DEVICE — ZIMMER® STERILE DISPOSABLE TOURNIQUET CUFF WITH PLC, DUAL PORT, SINGLE BLADDER, 34 IN. (86 CM)

## (undated) DEVICE — SUCTION CANISTER, 3000CC,SAFELINER: Brand: DEROYAL

## (undated) DEVICE — COVER SGL STRL LGHT HNDL BLU

## (undated) DEVICE — SOCK CNSTR 4IN TNPSL UNV SPEC

## (undated) DEVICE — TOWEL OR BLU 16X26 STRL

## (undated) DEVICE — DRAPE SHEET LG

## (undated) DEVICE — NEEDLE HPO 18GA 1.5IN ECLPS

## (undated) DEVICE — GAMMEX® PI HYBRID SIZE 9, STERILE POWDER-FREE SURGICAL GLOVE, POLYISOPRENE AND NEOPRENE BLEND: Brand: GAMMEX

## (undated) DEVICE — SOL  .9 3000ML

## (undated) DEVICE — TUBING SUCTION COLLECTION SET

## (undated) DEVICE — SUTURE ETHILON 3-0 669H

## (undated) NOTE — LETTER
10/28/21        [address]      Dear Everett Camejo,    [facility] records indicate that you have outstanding lab work and or testing that was ordered for you and has not yet been completed:  Orders Placed This Encounter      CBC With JOSE G

## (undated) NOTE — LETTER
October 12, 2018     [address]      Dear Kristian Lara:    Pap smear negative HPV, repeat in 3-year .     Below are the results from your recent visit:    Resulted Orders   HPV HIGH RISK , THIN PREP COLLECTION   Result Gini Authorizing Provider:  Monroe Gan MD          Collected:           10/08/2018 04:04 PM          Ordering Location[facility]     [address] [place] [address] [facility]       Received:            10/08/2018 07:57 PM                                 Street, [place]

## (undated) NOTE — LETTER
7/15/2025    Shayna Perez        [address] [place] [address]            Dear Shayna Perez,      Our records indicate that you are due for an appointment for a Colonoscopy with ZHEN Mujica MD. Our doctors are booking out about 3-6 months in advance for procedures.     Please call our office to schedule this appointment.  Your medical well-being is important to us.    If your insurance requires a referral, please call your primary care office to request one.      Thank you,      The Physicians and Staff at [facility]

## (undated) NOTE — MR AVS SNAPSHOT
[facility]  [address] [place] [address], [address] [facility]  Ernst Andradea               Thank you for choosing us for your health care visit with Everett Herron MD.  We are glad to serve you and happy to provide you with this summary of yo Facility, call Central Scheduling at [phone], Monday through Friday between 7:30am to 6pm and on Saturday between 8am and 1pm. Evening and weekend appointments for your exam are available.      [facility]  D Get your heart pumping – brisk walking, biking, swimming Even 10 minute increments are effective and add up over the week   2 ½ hours per week – spread out over time Use a rick to keep you motivated   Don’t forget strength training with weights and resist

## (undated) NOTE — LETTER
5/24/2024              Shayna Perez        [address] [place] [address]         To whom it may concern.    Patient is under medical care for severe asthmatic bronchitis.  Patient was advised not to travel outside of the country for the next 90 days until symptoms resolve.      Sincerely,    Lorna Baum MD          Document electronically generated by:  Lorna Baum MD

## (undated) NOTE — LETTER
AUTHORIZATION FOR SURGICAL OPERATION OR OTHER PROCEDURE    1. I hereby authorize Dr. Jamal Alejandra, and [facility] staff assigned to my case to perform the following operation and/or procedure at the [facility]:      ENDOSEE       2.   My physician ha Relationship to Patient:           []  Parent    Responsible person                          []  Spouse  In case of minor or                    [] Other  _____________   Incompetent name:  __________________________________________________

## (undated) NOTE — LETTER
10/17/2017              Te Parkinson         Dear Neal Peter,    A refill for pantoprazole 40mh was authorized and given to your pharmacy on 10/17/17.  No additional refills will be able to be approved until we s